# Patient Record
Sex: MALE | URBAN - METROPOLITAN AREA
[De-identification: names, ages, dates, MRNs, and addresses within clinical notes are randomized per-mention and may not be internally consistent; named-entity substitution may affect disease eponyms.]

---

## 2022-04-18 ENCOUNTER — APPOINTMENT (RX ONLY)
Dept: URBAN - METROPOLITAN AREA CLINIC 236 | Facility: CLINIC | Age: 72
Setting detail: DERMATOLOGY
End: 2022-04-18

## 2022-04-18 VITALS — WEIGHT: 160 LBS | HEIGHT: 67 IN

## 2022-04-18 DIAGNOSIS — C86.6 PRIMARY CUTANEOUS CD30-POSITIVE T-CELL PROLIFERATIONS: ICD-10-CM | Status: WELL CONTROLLED

## 2022-04-18 DIAGNOSIS — L57.0 ACTINIC KERATOSIS: ICD-10-CM | Status: STABLE

## 2022-04-18 PROCEDURE — 17003 DESTRUCT PREMALG LES 2-14: CPT

## 2022-04-18 PROCEDURE — ? ADDITIONAL NOTES

## 2022-04-18 PROCEDURE — ? LIQUID NITROGEN

## 2022-04-18 PROCEDURE — 99214 OFFICE O/P EST MOD 30 MIN: CPT | Mod: 25

## 2022-04-18 PROCEDURE — ? PRESCRIPTION MEDICATION MANAGEMENT

## 2022-04-18 PROCEDURE — ? PRESCRIPTION

## 2022-04-18 PROCEDURE — ? LAB REPORTS REVIEWED

## 2022-04-18 PROCEDURE — 17000 DESTRUCT PREMALG LESION: CPT

## 2022-04-18 PROCEDURE — ? COUNSELING

## 2022-04-18 RX ORDER — HYDROCORTISONE 25 MG/G
1 CREAM TOPICAL BID
Qty: 30 | Refills: 2 | Status: ERX | COMMUNITY
Start: 2022-04-18

## 2022-04-18 RX ADMIN — HYDROCORTISONE 1: 25 CREAM TOPICAL at 00:00

## 2022-04-18 ASSESSMENT — LOCATION DETAILED DESCRIPTION DERM
LOCATION DETAILED: LEFT SUPERIOR OCCIPITAL SCALP
LOCATION DETAILED: LEFT INFERIOR CENTRAL MALAR CHEEK
LOCATION DETAILED: MID-OCCIPITAL SCALP
LOCATION DETAILED: RIGHT SUPERIOR FOREHEAD
LOCATION DETAILED: RIGHT ANTERIOR DISTAL THIGH
LOCATION DETAILED: RIGHT INFERIOR LATERAL NECK
LOCATION DETAILED: RIGHT SUPERIOR CRUS OF ANTIHELIX
LOCATION DETAILED: RIGHT FOREHEAD
LOCATION DETAILED: RIGHT SUPERIOR OCCIPITAL SCALP
LOCATION DETAILED: LEFT ANTERIOR DISTAL THIGH

## 2022-04-18 ASSESSMENT — LOCATION ZONE DERM
LOCATION ZONE: LEG
LOCATION ZONE: SCALP
LOCATION ZONE: NECK
LOCATION ZONE: EAR
LOCATION ZONE: FACE

## 2022-04-18 ASSESSMENT — BSA RASH: BSA RASH: 8

## 2022-04-18 ASSESSMENT — LOCATION SIMPLE DESCRIPTION DERM
LOCATION SIMPLE: RIGHT EAR
LOCATION SIMPLE: POSTERIOR SCALP
LOCATION SIMPLE: RIGHT THIGH
LOCATION SIMPLE: LEFT CHEEK
LOCATION SIMPLE: RIGHT ANTERIOR NECK
LOCATION SIMPLE: LEFT THIGH
LOCATION SIMPLE: RIGHT FOREHEAD

## 2022-04-18 ASSESSMENT — SEVERITY ASSESSMENT: SEVERITY: ALMOST CLEAR

## 2022-04-18 NOTE — PROCEDURE: PRESCRIPTION MEDICATION MANAGEMENT
Render In Strict Bullet Format?: No
Detail Level: Zone
Continue Regimen: Cont methotrexate 2.5 6 po q week
Continue Regimen: Methotrexate 2.5 mg p take 6 pills on Thursday and TCS as needed for flare
Plan: Follow up with Dr. Peralta for further treatment per patient sons request

## 2022-06-29 ENCOUNTER — RX ONLY (OUTPATIENT)
Age: 72
Setting detail: RX ONLY
End: 2022-06-29

## 2022-06-29 RX ORDER — METHOTREXATE SODIUM 2.5 MG/1
TABLET ORAL
Qty: 72 | Refills: 1 | Status: ERX

## 2022-06-29 RX ORDER — FOLIC ACID 1 MG/1
TABLET ORAL
Qty: 90 | Refills: 1 | Status: ERX

## 2022-07-25 ENCOUNTER — APPOINTMENT (RX ONLY)
Dept: URBAN - METROPOLITAN AREA CLINIC 236 | Facility: CLINIC | Age: 72
Setting detail: DERMATOLOGY
End: 2022-07-25

## 2022-07-25 DIAGNOSIS — C86.6 PRIMARY CUTANEOUS CD30-POSITIVE T-CELL PROLIFERATIONS: ICD-10-CM | Status: IMPROVED

## 2022-07-25 PROCEDURE — 99213 OFFICE O/P EST LOW 20 MIN: CPT

## 2022-07-25 PROCEDURE — ? COUNSELING

## 2022-07-25 PROCEDURE — ? ADDITIONAL NOTES

## 2022-07-25 PROCEDURE — ? PRESCRIPTION MEDICATION MANAGEMENT

## 2022-07-25 PROCEDURE — ? ORDER TESTS

## 2022-07-25 ASSESSMENT — LOCATION SIMPLE DESCRIPTION DERM
LOCATION SIMPLE: LEFT THIGH
LOCATION SIMPLE: RIGHT THIGH

## 2022-07-25 ASSESSMENT — LOCATION DETAILED DESCRIPTION DERM
LOCATION DETAILED: RIGHT ANTERIOR PROXIMAL THIGH
LOCATION DETAILED: LEFT ANTERIOR PROXIMAL THIGH

## 2022-07-25 ASSESSMENT — BSA RASH: BSA RASH: 2

## 2022-07-25 ASSESSMENT — SEVERITY ASSESSMENT: SEVERITY: ALMOST CLEAR

## 2022-07-25 ASSESSMENT — LOCATION ZONE DERM: LOCATION ZONE: LEG

## 2022-07-25 NOTE — PROCEDURE: PRESCRIPTION MEDICATION MANAGEMENT
Detail Level: Zone
Continue Regimen: Methotrexate 2.5 mg 6 PO Qweek, hydrocortisone cream PRN with flare
Render In Strict Bullet Format?: No

## 2022-07-25 NOTE — PROCEDURE: ORDER TESTS
Bill For Surgical Tray: no
Billing Type: Third-Party Bill
Expected Date Of Service: 07/25/2022
Performing Laboratory: 0

## 2022-07-25 NOTE — PROCEDURE: ADDITIONAL NOTES
Render Risk Assessment In Note?: no
Detail Level: Simple
Additional Notes: Discussed visit with Dr. Peralta agrees with tx plan
Additional Notes: Pt to do bloodwork every 3 months

## 2022-11-28 ENCOUNTER — RX ONLY (OUTPATIENT)
Age: 72
Setting detail: RX ONLY
End: 2022-11-28

## 2022-11-28 RX ORDER — METHOTREXATE SODIUM 2.5 MG/1
TABLET ORAL
Qty: 72 | Refills: 1 | Status: ERX

## 2022-11-28 RX ORDER — HYDROCORTISONE 25 MG/G
1 CREAM TOPICAL BID
Qty: 30 | Refills: 2 | Status: ERX

## 2022-11-28 RX ORDER — KETOCONAZOLE 20 MG/ML
1 SHAMPOO, SUSPENSION TOPICAL
Qty: 120 | Refills: 3 | Status: ERX | COMMUNITY
Start: 2022-11-28

## 2023-01-19 ENCOUNTER — APPOINTMENT (RX ONLY)
Dept: URBAN - METROPOLITAN AREA CLINIC 203 | Facility: CLINIC | Age: 73
Setting detail: DERMATOLOGY
End: 2023-01-19

## 2023-01-19 DIAGNOSIS — Z02.9 ENCOUNTER FOR ADMINISTRATIVE EXAMINATIONS, UNSPECIFIED: ICD-10-CM

## 2023-01-19 PROBLEM — C86.6 PRIMARY CUTANEOUS CD30-POSITIVE T-CELL PROLIFERATIONS: Status: ACTIVE | Noted: 2023-01-19

## 2023-01-19 PROCEDURE — ? ORDER TESTS

## 2023-01-26 ENCOUNTER — APPOINTMENT (RX ONLY)
Dept: URBAN - METROPOLITAN AREA CLINIC 236 | Facility: CLINIC | Age: 73
Setting detail: DERMATOLOGY
End: 2023-01-26

## 2023-01-26 DIAGNOSIS — C86.6 PRIMARY CUTANEOUS CD30-POSITIVE T-CELL PROLIFERATIONS: ICD-10-CM | Status: INADEQUATELY CONTROLLED

## 2023-01-26 DIAGNOSIS — L57.0 ACTINIC KERATOSIS: ICD-10-CM

## 2023-01-26 PROCEDURE — ? PRESCRIPTION MEDICATION MANAGEMENT

## 2023-01-26 PROCEDURE — ? PRESCRIPTION

## 2023-01-26 PROCEDURE — ? COUNSELING

## 2023-01-26 PROCEDURE — 99214 OFFICE O/P EST MOD 30 MIN: CPT | Mod: 25

## 2023-01-26 PROCEDURE — ? LIQUID NITROGEN

## 2023-01-26 PROCEDURE — 17003 DESTRUCT PREMALG LES 2-14: CPT

## 2023-01-26 PROCEDURE — 17000 DESTRUCT PREMALG LESION: CPT

## 2023-01-26 PROCEDURE — ? ORDER TESTS

## 2023-01-26 RX ORDER — METHOTREXATE SODIUM 2.5 MG/1
TABLET ORAL QWEEK
Qty: 72 | Refills: 1 | Status: ERX

## 2023-01-26 RX ORDER — TRIAMCINOLONE ACETONIDE 1 MG/G
OINTMENT TOPICAL
Qty: 453.6 | Refills: 0 | Status: ERX | COMMUNITY
Start: 2023-01-26

## 2023-01-26 RX ADMIN — TRIAMCINOLONE ACETONIDE: 1 OINTMENT TOPICAL at 00:00

## 2023-01-26 ASSESSMENT — LOCATION SIMPLE DESCRIPTION DERM
LOCATION SIMPLE: LEFT LOWER BACK
LOCATION SIMPLE: ABDOMEN
LOCATION SIMPLE: LEFT EYEBROW
LOCATION SIMPLE: SCALP
LOCATION SIMPLE: RIGHT THIGH
LOCATION SIMPLE: LEFT TEMPLE
LOCATION SIMPLE: LEFT THIGH

## 2023-01-26 ASSESSMENT — LOCATION DETAILED DESCRIPTION DERM
LOCATION DETAILED: RIGHT ANTERIOR PROXIMAL THIGH
LOCATION DETAILED: LEFT LATERAL ABDOMEN
LOCATION DETAILED: LEFT LATERAL EYEBROW
LOCATION DETAILED: LEFT ANTERIOR PROXIMAL THIGH
LOCATION DETAILED: LEFT INFERIOR TEMPLE
LOCATION DETAILED: LEFT SUPERIOR PARIETAL SCALP
LOCATION DETAILED: LEFT SUPERIOR LATERAL LOWER BACK

## 2023-01-26 ASSESSMENT — LOCATION ZONE DERM
LOCATION ZONE: FACE
LOCATION ZONE: TRUNK
LOCATION ZONE: LEG
LOCATION ZONE: SCALP

## 2023-01-26 ASSESSMENT — BSA RASH: BSA RASH: 10

## 2023-01-26 ASSESSMENT — SEVERITY ASSESSMENT: SEVERITY: MODERATE

## 2023-01-26 NOTE — PROCEDURE: PRESCRIPTION MEDICATION MANAGEMENT
Render In Strict Bullet Format?: No
Plan: Continue folic acid everyday except the day of methotrexate\\n\\nAdvised to avoid NSAIDs while on MTX
Detail Level: Zone
Discontinue Regimen: Hydrocortisone 2.5%
Initiate Treatment: Triamcinolone oint to active rash bid x 2 weeks

## 2023-01-26 NOTE — PROCEDURE: LIQUID NITROGEN
Show Applicator Variable?: Yes
Detail Level: Detailed
Duration Of Freeze Thaw-Cycle (Seconds): 5
Post-Care Instructions: I reviewed with the patient in detail post-care instructions. Patient is to wear sunprotection, and avoid picking at any of the treated lesions. Pt may apply Vaseline to crusted or scabbing areas.
Render Post-Care Instructions In Note?: no
Number Of Freeze-Thaw Cycles: 1 freeze-thaw cycle
Consent: The patient's consent was obtained including but not limited to risks of crusting, scabbing, blistering, scarring, darker or lighter pigmentary change, recurrence, incomplete removal and infection.
Application Tool (Optional): Liquid Nitrogen Sprayer

## 2023-01-26 NOTE — PROCEDURE: ORDER TESTS
Expected Date Of Service: 01/26/2023
Performing Laboratory: 0
Clinical Notes (To The Lab): Standing order q 3 months
Billing Type: Third-Party Bill
Bill For Surgical Tray: no

## 2023-02-16 ENCOUNTER — RX ONLY (OUTPATIENT)
Age: 73
Setting detail: RX ONLY
End: 2023-02-16

## 2023-02-16 RX ORDER — FOLIC ACID 1 MG/1
TABLET ORAL
Qty: 90 | Refills: 1 | Status: ERX

## 2023-02-16 RX ORDER — METHOTREXATE SODIUM 2.5 MG/1
TABLET ORAL QWEEK
Qty: 72 | Refills: 1 | Status: CANCELLED
Stop reason: CLARIF

## 2023-04-27 ENCOUNTER — APPOINTMENT (RX ONLY)
Dept: URBAN - METROPOLITAN AREA CLINIC 236 | Facility: CLINIC | Age: 73
Setting detail: DERMATOLOGY
End: 2023-04-27

## 2023-04-27 DIAGNOSIS — C86.6 PRIMARY CUTANEOUS CD30-POSITIVE T-CELL PROLIFERATIONS: ICD-10-CM | Status: WELL CONTROLLED

## 2023-04-27 PROCEDURE — ? COUNSELING

## 2023-04-27 PROCEDURE — 99213 OFFICE O/P EST LOW 20 MIN: CPT

## 2023-04-27 PROCEDURE — ? PRESCRIPTION

## 2023-04-27 PROCEDURE — ? PRESCRIPTION MEDICATION MANAGEMENT

## 2023-04-27 RX ORDER — TRIAMCINOLONE ACETONIDE 1 MG/G
CREAM TOPICAL
Qty: 453.6 | Refills: 5 | Status: ERX | COMMUNITY
Start: 2023-04-27

## 2023-04-27 RX ADMIN — TRIAMCINOLONE ACETONIDE: 1 CREAM TOPICAL at 00:00

## 2023-04-27 ASSESSMENT — LOCATION DETAILED DESCRIPTION DERM
LOCATION DETAILED: LEFT SUPERIOR LATERAL LOWER BACK
LOCATION DETAILED: LEFT ANTERIOR PROXIMAL THIGH
LOCATION DETAILED: RIGHT ANTERIOR PROXIMAL THIGH
LOCATION DETAILED: LEFT LATERAL ABDOMEN

## 2023-04-27 ASSESSMENT — LOCATION ZONE DERM
LOCATION ZONE: TRUNK
LOCATION ZONE: LEG

## 2023-04-27 ASSESSMENT — LOCATION SIMPLE DESCRIPTION DERM
LOCATION SIMPLE: ABDOMEN
LOCATION SIMPLE: RIGHT THIGH
LOCATION SIMPLE: LEFT THIGH
LOCATION SIMPLE: LEFT LOWER BACK

## 2023-04-27 ASSESSMENT — SEVERITY ASSESSMENT: SEVERITY: ALMOST CLEAR

## 2023-08-24 ENCOUNTER — RX ONLY (OUTPATIENT)
Age: 73
Setting detail: RX ONLY
End: 2023-08-24

## 2023-08-24 RX ORDER — METHOTREXATE SODIUM 2.5 MG/1
TABLET ORAL QWEEK
Qty: 72 | Refills: 1 | Status: ERX

## 2023-08-24 RX ORDER — FOLIC ACID 1 MG/1
TABLET ORAL
Qty: 90 | Refills: 1 | Status: ERX

## 2023-10-26 ENCOUNTER — APPOINTMENT (RX ONLY)
Dept: URBAN - METROPOLITAN AREA CLINIC 236 | Facility: CLINIC | Age: 73
Setting detail: DERMATOLOGY
End: 2023-10-26

## 2023-10-26 DIAGNOSIS — C86.6 PRIMARY CUTANEOUS CD30-POSITIVE T-CELL PROLIFERATIONS: ICD-10-CM | Status: WELL CONTROLLED

## 2023-10-26 PROCEDURE — 99213 OFFICE O/P EST LOW 20 MIN: CPT

## 2023-10-26 PROCEDURE — ? PRESCRIPTION MEDICATION MANAGEMENT

## 2023-10-26 PROCEDURE — ? LAB REPORTS REVIEWED

## 2023-10-26 PROCEDURE — ? COUNSELING

## 2023-10-26 ASSESSMENT — LOCATION SIMPLE DESCRIPTION DERM
LOCATION SIMPLE: RIGHT THIGH
LOCATION SIMPLE: LEFT THIGH
LOCATION SIMPLE: ABDOMEN
LOCATION SIMPLE: LEFT LOWER BACK

## 2023-10-26 ASSESSMENT — LOCATION DETAILED DESCRIPTION DERM
LOCATION DETAILED: LEFT LATERAL ABDOMEN
LOCATION DETAILED: LEFT ANTERIOR PROXIMAL THIGH
LOCATION DETAILED: LEFT SUPERIOR LATERAL LOWER BACK
LOCATION DETAILED: RIGHT ANTERIOR PROXIMAL THIGH

## 2023-10-26 ASSESSMENT — LOCATION ZONE DERM
LOCATION ZONE: TRUNK
LOCATION ZONE: LEG

## 2023-10-26 ASSESSMENT — BSA RASH: BSA RASH: 5

## 2023-10-26 ASSESSMENT — SEVERITY ASSESSMENT: SEVERITY: MODERATE

## 2023-10-26 NOTE — PROCEDURE: PRESCRIPTION MEDICATION MANAGEMENT
Render In Strict Bullet Format?: No
Continue Regimen: Triamcinolone oint to active three x a week \\n\\nAdvised to avoid NSAIDs while on MTX.
Detail Level: Zone
Initiate Treatment: Triamcinolone oint to active rash bid x 2 weeks\\n\\nMethotrexate take 6 tabs q week\\n\\nTake folic acid \\n\\nFollow up with oncology yearly

## 2024-01-25 ENCOUNTER — APPOINTMENT (RX ONLY)
Dept: URBAN - METROPOLITAN AREA CLINIC 236 | Facility: CLINIC | Age: 74
Setting detail: DERMATOLOGY
End: 2024-01-25

## 2024-01-25 DIAGNOSIS — C86.6 PRIMARY CUTANEOUS CD30-POSITIVE T-CELL PROLIFERATIONS: ICD-10-CM | Status: WELL CONTROLLED

## 2024-01-25 DIAGNOSIS — L57.0 ACTINIC KERATOSIS: ICD-10-CM | Status: INADEQUATELY CONTROLLED

## 2024-01-25 DIAGNOSIS — L82.1 OTHER SEBORRHEIC KERATOSIS: ICD-10-CM | Status: STABLE

## 2024-01-25 PROCEDURE — 17000 DESTRUCT PREMALG LESION: CPT

## 2024-01-25 PROCEDURE — 99213 OFFICE O/P EST LOW 20 MIN: CPT | Mod: 25

## 2024-01-25 PROCEDURE — ? COUNSELING

## 2024-01-25 PROCEDURE — 17003 DESTRUCT PREMALG LES 2-14: CPT

## 2024-01-25 PROCEDURE — ? LIQUID NITROGEN

## 2024-01-25 PROCEDURE — ? PRESCRIPTION MEDICATION MANAGEMENT

## 2024-01-25 ASSESSMENT — LOCATION DETAILED DESCRIPTION DERM
LOCATION DETAILED: RIGHT ANTERIOR PROXIMAL THIGH
LOCATION DETAILED: LEFT SUPERIOR PARIETAL SCALP
LOCATION DETAILED: LEFT SUPERIOR FOREHEAD
LOCATION DETAILED: LEFT ANTERIOR PROXIMAL THIGH
LOCATION DETAILED: RIGHT SUPERIOR CENTRAL MALAR CHEEK
LOCATION DETAILED: RIGHT SUPERIOR PARIETAL SCALP
LOCATION DETAILED: RIGHT LATERAL FOREHEAD
LOCATION DETAILED: LEFT SUPERIOR OCCIPITAL SCALP
LOCATION DETAILED: LEFT CENTRAL MALAR CHEEK
LOCATION DETAILED: LEFT SUPERIOR LATERAL LOWER BACK
LOCATION DETAILED: RIGHT CENTRAL EYEBROW
LOCATION DETAILED: LEFT LATERAL ABDOMEN
LOCATION DETAILED: RIGHT SUPERIOR FOREHEAD

## 2024-01-25 ASSESSMENT — LOCATION SIMPLE DESCRIPTION DERM
LOCATION SIMPLE: LEFT THIGH
LOCATION SIMPLE: LEFT LOWER BACK
LOCATION SIMPLE: RIGHT FOREHEAD
LOCATION SIMPLE: RIGHT THIGH
LOCATION SIMPLE: RIGHT CHEEK
LOCATION SIMPLE: SCALP
LOCATION SIMPLE: LEFT OCCIPITAL SCALP
LOCATION SIMPLE: RIGHT EYEBROW
LOCATION SIMPLE: LEFT FOREHEAD
LOCATION SIMPLE: ABDOMEN
LOCATION SIMPLE: LEFT CHEEK

## 2024-01-25 ASSESSMENT — LOCATION ZONE DERM
LOCATION ZONE: FACE
LOCATION ZONE: LEG
LOCATION ZONE: TRUNK
LOCATION ZONE: SCALP

## 2024-01-25 ASSESSMENT — SEVERITY ASSESSMENT: SEVERITY: MODERATE

## 2024-01-25 ASSESSMENT — BSA RASH: BSA RASH: 5

## 2024-01-25 NOTE — PROCEDURE: PRESCRIPTION MEDICATION MANAGEMENT
Render In Strict Bullet Format?: No
Plan: Advised to avoid NSAIDs while on Methotrexate \\n\\nTake folic acid \\n\\nFollow up with oncology yearly
Continue Regimen: Triamcinolone to affected areas 3x/week
Detail Level: Zone
Modify Regimen: Methotrexate take 4 tabs q week

## 2024-01-25 NOTE — PROCEDURE: LIQUID NITROGEN
Number Of Freeze-Thaw Cycles: 1 freeze-thaw cycle
Detail Level: Detailed
Consent: The patient's consent was obtained including but not limited to risks of crusting, scabbing, blistering, scarring, darker or lighter pigmentary change, recurrence, incomplete removal and infection.
Render Post-Care Instructions In Note?: no
Post-Care Instructions: I reviewed with the patient in detail post-care instructions. Patient is to wear sunprotection, and avoid picking at any of the treated lesions. Pt may apply Vaseline to crusted or scabbing areas.
Show Aperture Variable?: Yes
Application Tool (Optional): Liquid Nitrogen Sprayer
Duration Of Freeze Thaw-Cycle (Seconds): 5

## 2024-04-25 ENCOUNTER — APPOINTMENT (RX ONLY)
Dept: URBAN - METROPOLITAN AREA CLINIC 236 | Facility: CLINIC | Age: 74
Setting detail: DERMATOLOGY
End: 2024-04-25

## 2024-04-25 DIAGNOSIS — L21.8 OTHER SEBORRHEIC DERMATITIS: ICD-10-CM

## 2024-04-25 DIAGNOSIS — C86.6 PRIMARY CUTANEOUS CD30-POSITIVE T-CELL PROLIFERATIONS: ICD-10-CM | Status: WELL CONTROLLED

## 2024-04-25 PROCEDURE — 99214 OFFICE O/P EST MOD 30 MIN: CPT

## 2024-04-25 PROCEDURE — ? MEDICATION COUNSELING

## 2024-04-25 PROCEDURE — ? MDM - TREATMENT GOALS

## 2024-04-25 PROCEDURE — ? PRESCRIPTION

## 2024-04-25 PROCEDURE — ? PRESCRIPTION MEDICATION MANAGEMENT

## 2024-04-25 PROCEDURE — ? COUNSELING

## 2024-04-25 RX ORDER — TRIAMCINOLONE ACETONIDE 1 MG/G
CREAM TOPICAL
Qty: 453.6 | Refills: 5 | Status: ERX

## 2024-04-25 RX ORDER — KETOCONAZOLE 20 MG/ML
SHAMPOO, SUSPENSION TOPICAL TIW
Qty: 120 | Refills: 5 | Status: ERX

## 2024-04-25 ASSESSMENT — SEVERITY ASSESSMENT: SEVERITY: ALMOST CLEAR

## 2024-04-25 ASSESSMENT — LOCATION DETAILED DESCRIPTION DERM
LOCATION DETAILED: SUPERIOR MID FOREHEAD
LOCATION DETAILED: RIGHT ANTERIOR PROXIMAL THIGH
LOCATION DETAILED: LEFT LATERAL ABDOMEN
LOCATION DETAILED: LEFT ANTERIOR PROXIMAL THIGH
LOCATION DETAILED: LEFT SUPERIOR LATERAL LOWER BACK

## 2024-04-25 ASSESSMENT — LOCATION ZONE DERM
LOCATION ZONE: TRUNK
LOCATION ZONE: FACE
LOCATION ZONE: LEG

## 2024-04-25 ASSESSMENT — LOCATION SIMPLE DESCRIPTION DERM
LOCATION SIMPLE: LEFT THIGH
LOCATION SIMPLE: LEFT LOWER BACK
LOCATION SIMPLE: SUPERIOR FOREHEAD
LOCATION SIMPLE: ABDOMEN
LOCATION SIMPLE: RIGHT THIGH

## 2024-04-25 NOTE — PROCEDURE: PRESCRIPTION MEDICATION MANAGEMENT
Render In Strict Bullet Format?: No
Plan: Advised to avoid NSAIDs while on Methotrexate \\n\\nTake folic acid \\n\\nFollow up with oncology yearly\\n\\nPatient to hold methotrexate due to having rotator cuff surgery will continue after cleared by surgeon
Continue Regimen: Triamcinolone to affected areas 3x/week
Detail Level: Zone
Modify Regimen: Methotrexate take 4 tabs q week
Continue Regimen: ketoconazole 2 % shampoo TIW\\nQuantity: 120.0 ml  Days Supply: 30\\nSig: apply to  the scalp 2-3x a week. let it sit for 5 minutes before you rinse .

## 2024-04-25 NOTE — PROCEDURE: MEDICATION COUNSELING
Carac Counseling:  I discussed with the patient the risks of Carac including but not limited to erythema, scaling, itching, weeping, crusting, and pain.
Siliq Counseling:  I discussed with the patient the risks of Siliq including but not limited to new or worsening depression, suicidal thoughts and behavior, immunosuppression, malignancy, posterior leukoencephalopathy syndrome, and serious infections.  The patient understands that monitoring is required including a PPD at baseline and must alert us or the primary physician if symptoms of infection or other concerning signs are noted. There is also a special program designed to monitor depression which is required with Siliq.
Elidel Pregnancy And Lactation Text: This medication is Pregnancy Category C. It is unknown if this medication is excreted in breast milk.
Cimetidine Counseling:  I discussed with the patient the risks of Cimetidine including but not limited to gynecomastia, headache, diarrhea, nausea, drowsiness, arrhythmias, pancreatitis, skin rashes, psychosis, bone marrow suppression and kidney toxicity.
SSKI Counseling:  I discussed with the patient the risks of SSKI including but not limited to thyroid abnormalities, metallic taste, GI upset, fever, headache, acne, arthralgias, paraesthesias, lymphadenopathy, easy bleeding, arrhythmias, and allergic reaction.
Use Enhanced Medication Counseling?: No
Griseofulvin Counseling:  I discussed with the patient the risks of griseofulvin including but not limited to photosensitivity, cytopenia, liver damage, nausea/vomiting and severe allergy.  The patient understands that this medication is best absorbed when taken with a fatty meal (e.g., ice cream or french fries).
Klisyri Pregnancy And Lactation Text: It is unknown if this medication can harm a developing fetus or if it is excreted in breast milk.
Finasteride Pregnancy And Lactation Text: This medication is absolutely contraindicated during pregnancy. It is unknown if it is excreted in breast milk.
Protopic Counseling: Patient may experience a mild burning sensation during topical application. Protopic is not approved in children less than 2 years of age. There have been case reports of hematologic and skin malignancies in patients using topical calcineurin inhibitors although causality is questionable.
Taltz Counseling: I discussed with the patient the risks of ixekizumab including but not limited to immunosuppression, serious infections, worsening of inflammatory bowel disease and drug reactions.  The patient understands that monitoring is required including a PPD at baseline and must alert us or the primary physician if symptoms of infection or other concerning signs are noted.
Soolantra Pregnancy And Lactation Text: This medication is Pregnancy Category C. This medication is considered safe during breast feeding.
Sotyktu Counseling:  I discussed the most common side effects of Sotyktu including: common cold, sore throat, sinus infections, cold sores, canker sores, folliculitis, and acne.? I also discussed more serious side effects of Sotyktu including but not limited to: serious allergic reactions; increased risk for infections such as TB; cancers such as lymphomas; rhabdomyolysis and elevated CPK; and elevated triglycerides and liver enzymes.?
Wartpeel Counseling:  I discussed with the patient the risks of Wartpeel including but not limited to erythema, scaling, itching, weeping, crusting, and pain.
Ivermectin Pregnancy And Lactation Text: This medication is Pregnancy Category C and it isn't known if it is safe during pregnancy. It is also excreted in breast milk.
Libtayo Pregnancy And Lactation Text: This medication is contraindicated in pregnancy and when breast feeding.
Azithromycin Pregnancy And Lactation Text: This medication is considered safe during pregnancy and is also secreted in breast milk.
Erythromycin Counseling:  I discussed with the patient the risks of erythromycin including but not limited to GI upset, allergic reaction, drug rash, diarrhea, increase in liver enzymes, and yeast infections.
High Dose Vitamin A Counseling: Side effects reviewed, pt to contact office should one occur.
Rifampin Pregnancy And Lactation Text: This medication is Pregnancy Category C and it isn't know if it is safe during pregnancy. It is also excreted in breast milk and should not be used if you are breast feeding.
Cimetidine Pregnancy And Lactation Text: This medication is Pregnancy Category B and is considered safe during pregnancy. It is also excreted in breast milk and breast feeding isn't recommended.
Colchicine Pregnancy And Lactation Text: This medication is Pregnancy Category C and isn't considered safe during pregnancy. It is excreted in breast milk.
Hydroxychloroquine Pregnancy And Lactation Text: This medication has been shown to cause fetal harm but it isn't assigned a Pregnancy Risk Category. There are small amounts excreted in breast milk.
Oral Minoxidil Counseling- I discussed with the patient the risks of oral minoxidil including but not limited to shortness of breath, swelling of the feet or ankles, dizziness, lightheadedness, unwanted hair growth and allergic reaction.  The patient verbalized understanding of the proper use and possible adverse effects of oral minoxidil.  All of the patient's questions and concerns were addressed.
Topical Metronidazole Counseling: Metronidazole is a topical antibiotic medication. You may experience burning, stinging, redness, or allergic reactions.  Please call our office if you develop any problems from using this medication.
Cosentyx Pregnancy And Lactation Text: This medication is Pregnancy Category B and is considered safe during pregnancy. It is unknown if this medication is excreted in breast milk.
Cellcept Pregnancy And Lactation Text: This medication is Pregnancy Category D and isn't considered safe during pregnancy. It is unknown if this medication is excreted in breast milk.
Protopic Pregnancy And Lactation Text: This medication is Pregnancy Category C. It is unknown if this medication is excreted in breast milk when applied topically.
Topical Retinoid counseling:  Patient advised to apply a pea-sized amount only at bedtime and wait 30 minutes after washing their face before applying.  If too drying, patient may add a non-comedogenic moisturizer. The patient verbalized understanding of the proper use and possible adverse effects of retinoids.  All of the patient's questions and concerns were addressed.
Cibinqo Counseling: I discussed with the patient the risks of Cibinqo therapy including but not limited to common cold, nausea, headache, cold sores, increased blood CPK levels, dizziness, UTIs, fatigue, acne, and vomitting. Live vaccines should be avoided.  This medication has been linked to serious infections; higher rate of mortality; malignancy and lymphoproliferative disorders; major adverse cardiovascular events; thrombosis; thrombocytopenia and lymphopenia; lipid elevations; and retinal detachment.
Birth Control Pills Counseling: Birth Control Pill Counseling: I discussed with the patient the potential side effects of OCPs including but not limited to increased risk of stroke, heart attack, thrombophlebitis, deep venous thrombosis, hepatic adenomas, breast changes, GI upset, headaches, and depression.  The patient verbalized understanding of the proper use and possible adverse effects of OCPs. All of the patient's questions and concerns were addressed.
Carac Pregnancy And Lactation Text: This medication is Pregnancy Category X and contraindicated in pregnancy and in women who may become pregnant. It is unknown if this medication is excreted in breast milk.
Taltz Pregnancy And Lactation Text: The risk during pregnancy and breastfeeding is uncertain with this medication.
Minoxidil Counseling: Minoxidil is a topical medication which can increase blood flow where it is applied. It is uncertain how this medication increases hair growth. Side effects are uncommon and include stinging and allergic reactions.
Sski Pregnancy And Lactation Text: This medication is Pregnancy Category D and isn't considered safe during pregnancy. It is excreted in breast milk.
Erythromycin Pregnancy And Lactation Text: This medication is Pregnancy Category B and is considered safe during pregnancy. It is also excreted in breast milk.
High Dose Vitamin A Pregnancy And Lactation Text: High dose vitamin A therapy is contraindicated during pregnancy and breast feeding.
Bactrim Counseling:  I discussed with the patient the risks of sulfa antibiotics including but not limited to GI upset, allergic reaction, drug rash, diarrhea, dizziness, photosensitivity, and yeast infections.  Rarely, more serious reactions can occur including but not limited to aplastic anemia, agranulocytosis, methemoglobinemia, blood dyscrasias, liver or kidney failure, lung infiltrates or desquamative/blistering drug rashes.
Sarecycline Counseling: Patient advised regarding possible photosensitivity and discoloration of the teeth, skin, lips, tongue and gums.  Patient instructed to avoid sunlight, if possible.  When exposed to sunlight, patients should wear protective clothing, sunglasses, and sunscreen.  The patient was instructed to call the office immediately if the following severe adverse effects occur:  hearing changes, easy bruising/bleeding, severe headache, or vision changes.  The patient verbalized understanding of the proper use and possible adverse effects of sarecycline.  All of the patient's questions and concerns were addressed.
Griseofulvin Pregnancy And Lactation Text: This medication is Pregnancy Category X and is known to cause serious birth defects. It is unknown if this medication is excreted in breast milk but breast feeding should be avoided.
Eucrisa Counseling: Patient may experience a mild burning sensation during topical application. Eucrisa is not approved in children less than 2 years of age.
Sotyktu Pregnancy And Lactation Text: There is insufficient data to evaluate whether or not Sotyktu is safe to use during pregnancy.? ?It is not known if Sotyktu passes into breast milk and whether or not it is safe to use when breastfeeding.??
Topical Metronidazole Pregnancy And Lactation Text: This medication is Pregnancy Category B and considered safe during pregnancy.  It is also considered safe to use while breastfeeding.
Dapsone Counseling: I discussed with the patient the risks of dapsone including but not limited to hemolytic anemia, agranulocytosis, rashes, methemoglobinemia, kidney failure, peripheral neuropathy, headaches, GI upset, and liver toxicity.  Patients who start dapsone require monitoring including baseline LFTs and weekly CBCs for the first month, then every month thereafter.  The patient verbalized understanding of the proper use and possible adverse effects of dapsone.  All of the patient's questions and concerns were addressed.
Odomzo Counseling- I discussed with the patient the risks of Odomzo including but not limited to nausea, vomiting, diarrhea, constipation, weight loss, changes in the sense of taste, decreased appetite, muscle spasms, and hair loss.  The patient verbalized understanding of the proper use and possible adverse effects of Odomzo.  All of the patient's questions and concerns were addressed.
Cyclophosphamide Counseling:  I discussed with the patient the risks of cyclophosphamide including but not limited to hair loss, hormonal abnormalities, decreased fertility, abdominal pain, diarrhea, nausea and vomiting, bone marrow suppression and infection. The patient understands that monitoring is required while taking this medication.
Dupixent Counseling: I discussed with the patient the risks of dupilumab including but not limited to eye infection and irritation, cold sores, injection site reactions, worsening of asthma, allergic reactions and increased risk of parasitic infection.  Live vaccines should be avoided while taking dupilumab. Dupilumab will also interact with certain medications such as warfarin and cyclosporine. The patient understands that monitoring is required and they must alert us or the primary physician if symptoms of infection or other concerning signs are noted.
Tremfya Counseling: I discussed with the patient the risks of guselkumab including but not limited to immunosuppression, serious infections, and drug reactions.  The patient understands that monitoring is required including a PPD at baseline and must alert us or the primary physician if symptoms of infection or other concerning signs are noted.
Doxepin Counseling:  Patient advised that the medication is sedating and not to drive a car after taking this medication. Patient informed of potential adverse effects including but not limited to dry mouth, urinary retention, and blurry vision.  The patient verbalized understanding of the proper use and possible adverse effects of doxepin.  All of the patient's questions and concerns were addressed.
Birth Control Pills Pregnancy And Lactation Text: This medication should be avoided if pregnant and for the first 30 days post-partum.
Adbry Counseling: I discussed with the patient the risks of tralokinumab including but not limited to eye infection and irritation, cold sores, injection site reactions, worsening of asthma, allergic reactions and increased risk of parasitic infection.  Live vaccines should be avoided while taking tralokinumab. The patient understands that monitoring is required and they must alert us or the primary physician if symptoms of infection or other concerning signs are noted.
Oral Minoxidil Pregnancy And Lactation Text: This medication should only be used when clearly needed if you are pregnant, attempting to become pregnant or breast feeding.
Thalidomide Counseling: I discussed with the patient the risks of thalidomide including but not limited to birth defects, anxiety, weakness, chest pain, dizziness, cough and severe allergy.
Low Dose Naltrexone Counseling- I discussed with the patient the potential risks and side effects of low dose naltrexone including but not limited to: more vivid dreams, headaches, nausea, vomiting, abdominal pain, fatigue, dizziness, and anxiety.
Calcipotriene Counseling:  I discussed with the patient the risks of calcipotriene including but not limited to erythema, scaling, itching, and irritation.
Qbrexza Counseling:  I discussed with the patient the risks of Qbrexza including but not limited to headache, mydriasis, blurred vision, dry eyes, nasal dryness, dry mouth, dry throat, dry skin, urinary hesitation, and constipation.  Local skin reactions including erythema, burning, stinging, and itching can also occur.
Metronidazole Counseling:  I discussed with the patient the risks of metronidazole including but not limited to seizures, nausea/vomiting, a metallic taste in the mouth, nausea/vomiting and severe allergy.
Cibinqo Pregnancy And Lactation Text: It is unknown if this medication will adversely affect pregnancy or breast feeding.  You should not take this medication if you are currently pregnant or planning a pregnancy or while breastfeeding.
Xeljanz Counseling: I discussed with the patient the risks of Xeljanz therapy including increased risk of infection, liver issues, headache, diarrhea, or cold symptoms. Live vaccines should be avoided. They were instructed to call if they have any problems.
Eucrisa Pregnancy And Lactation Text: This medication has not been assigned a Pregnancy Risk Category but animal studies failed to show danger with the topical medication. It is unknown if the medication is excreted in breast milk.
Opioid Counseling: I discussed with the patient the potential side effects of opioids including but not limited to addiction, altered mental status, and depression. I stressed avoiding alcohol, benzodiazepines, muscle relaxants and sleep aids unless specifically okayed by a physician. The patient verbalized understanding of the proper use and possible adverse effects of opioids. All of the patient's questions and concerns were addressed. They were instructed to flush the remaining pills down the toilet if they did not need them for pain.
Bactrim Pregnancy And Lactation Text: This medication is Pregnancy Category D and is known to cause fetal risk.  It is also excreted in breast milk.
Dupixent Pregnancy And Lactation Text: This medication likely crosses the placenta but the risk for the fetus is uncertain. This medication is excreted in breast milk.
Cyclophosphamide Pregnancy And Lactation Text: This medication is Pregnancy Category D and it isn't considered safe during pregnancy. This medication is excreted in breast milk.
Simponi Counseling:  I discussed with the patient the risks of golimumab including but not limited to myelosuppression, immunosuppression, autoimmune hepatitis, demyelinating diseases, lymphoma, and serious infections.  The patient understands that monitoring is required including a PPD at baseline and must alert us or the primary physician if symptoms of infection or other concerning signs are noted.
Winlevi Counseling:  I discussed with the patient the risks of topical clascoterone including but not limited to erythema, scaling, itching, and stinging. Patient voiced their understanding.
Calcipotriene Pregnancy And Lactation Text: The use of this medication during pregnancy or lactation is not recommended as there is insufficient data.
Sarecycline Pregnancy And Lactation Text: This medication is Pregnancy Category D and not consider safe during pregnancy. It is also excreted in breast milk.
Itraconazole Counseling:  I discussed with the patient the risks of itraconazole including but not limited to liver damage, nausea/vomiting, neuropathy, and severe allergy.  The patient understands that this medication is best absorbed when taken with acidic beverages such as non-diet cola or ginger ale.  The patient understands that monitoring is required including baseline LFTs and repeat LFTs at intervals.  The patient understands that they are to contact us or the primary physician if concerning signs are noted.
Acitretin Counseling:  I discussed with the patient the risks of acitretin including but not limited to hair loss, dry lips/skin/eyes, liver damage, hyperlipidemia, depression/suicidal ideation, photosensitivity.  Serious rare side effects can include but are not limited to pancreatitis, pseudotumor cerebri, bony changes, clot formation/stroke/heart attack.  Patient understands that alcohol is contraindicated since it can result in liver toxicity and significantly prolong the elimination of the drug by many years.
Thalidomide Pregnancy And Lactation Text: This medication is Pregnancy Category X and is absolutely contraindicated during pregnancy. It is unknown if it is excreted in breast milk.
Otezla Counseling: The side effects of Otezla were discussed with the patient, including but not limited to worsening or new depression, weight loss, diarrhea, nausea, upper respiratory tract infection, and headache. Patient instructed to call the office should any adverse effect occur.  The patient verbalized understanding of the proper use and possible adverse effects of Otezla.  All the patient's questions and concerns were addressed.
Doxepin Pregnancy And Lactation Text: This medication is Pregnancy Category C and it isn't known if it is safe during pregnancy. It is also excreted in breast milk and breast feeding isn't recommended.
Low Dose Naltrexone Pregnancy And Lactation Text: Naltrexone is pregnancy category C.  There have been no adequate and well-controlled studies in pregnant women.  It should be used in pregnancy only if the potential benefit justifies the potential risk to the fetus.   Limited data indicates that naltrexone is minimally excreted into breastmilk.
Xelhernánz Pregnancy And Lactation Text: This medication is Pregnancy Category D and is not considered safe during pregnancy.  The risk during breast feeding is also uncertain.
Hydroquinone Counseling:  Patient advised that medication may result in skin irritation, lightening (hypopigmentation), dryness, and burning.  In the event of skin irritation, the patient was advised to reduce the amount of the drug applied or use it less frequently.  Rarely, spots that are treated with hydroquinone can become darker (pseudoochronosis).  Should this occur, patient instructed to stop medication and call the office. The patient verbalized understanding of the proper use and possible adverse effects of hydroquinone.  All of the patient's questions and concerns were addressed.
Litfulo Counseling: I discussed with the patient the risks of Litfulo therapy including but not limited to upper respiratory tract infections, shingles, cold sores, and nausea. Live vaccines should be avoided.  This medication has been linked to serious infections; higher rate of mortality; malignancy and lymphoproliferative disorders; major adverse cardiovascular events; thrombosis; gastrointestinal perforations; neutropenia; lymphopenia; anemia; liver enzyme elevations; and lipid elevations.
Mirvaso Counseling: Mirvaso is a topical medication which can decrease superficial blood flow where applied. Side effects are uncommon and include stinging, redness and allergic reactions.
Qbrexza Pregnancy And Lactation Text: There is no available data on Qbrexza use in pregnant women.  There is no available data on Qbrexza use in lactation.
Adbry Pregnancy And Lactation Text: It is unknown if this medication will adversely affect pregnancy or breast feeding.
Opioid Pregnancy And Lactation Text: These medications can lead to premature delivery and should be avoided during pregnancy. These medications are also present in breast milk in small amounts.
Aklief counseling:  Patient advised to apply a pea-sized amount only at bedtime and wait 30 minutes after washing their face before applying.  If too drying, patient may add a non-comedogenic moisturizer.  The most commonly reported side effects including irritation, redness, scaling, dryness, stinging, burning, itching, and increased risk of sunburn.  The patient verbalized understanding of the proper use and possible adverse effects of retinoids.  All of the patient's questions and concerns were addressed.
Ilumya Counseling: I discussed with the patient the risks of tildrakizumab including but not limited to immunosuppression, malignancy, posterior leukoencephalopathy syndrome, and serious infections.  The patient understands that monitoring is required including a PPD at baseline and must alert us or the primary physician if symptoms of infection or other concerning signs are noted.
Cantharidin Counseling:  I discussed with the patient the risks of Cantharidin including but not limited to pain, redness, burning, itching, and blistering.
Metronidazole Pregnancy And Lactation Text: This medication is Pregnancy Category B and considered safe during pregnancy.  It is also excreted in breast milk.
Dapsone Pregnancy And Lactation Text: This medication is Pregnancy Category C and is not considered safe during pregnancy or breast feeding.
Acitretin Pregnancy And Lactation Text: This medication is Pregnancy Category X and should not be given to women who are pregnant or may become pregnant in the future. This medication is excreted in breast milk.
Cephalexin Counseling: I counseled the patient regarding use of cephalexin as an antibiotic for prophylactic and/or therapeutic purposes. Cephalexin (commonly prescribed under brand name Keflex) is a cephalosporin antibiotic which is active against numerous classes of bacteria, including most skin bacteria. Side effects may include nausea, diarrhea, gastrointestinal upset, rash, hives, yeast infections, and in rare cases, hepatitis, kidney disease, seizures, fever, confusion, neurologic symptoms, and others. Patients with severe allergies to penicillin medications are cautioned that there is about a 10% incidence of cross-reactivity with cephalosporins. When possible, patients with penicillin allergies should use alternatives to cephalosporins for antibiotic therapy.
Tazorac Counseling:  Patient advised that medication is irritating and drying.  Patient may need to apply sparingly and wash off after an hour before eventually leaving it on overnight.  The patient verbalized understanding of the proper use and possible adverse effects of tazorac.  All of the patient's questions and concerns were addressed.
Spironolactone Counseling: Patient advised regarding risks of diarrhea, abdominal pain, hyperkalemia, birth defects (for female patients), liver toxicity and renal toxicity. The patient may need blood work to monitor liver and kidney function and potassium levels while on therapy. The patient verbalized understanding of the proper use and possible adverse effects of spironolactone.  All of the patient's questions and concerns were addressed.
Itraconazole Pregnancy And Lactation Text: This medication is Pregnancy Category C and it isn't know if it is safe during pregnancy. It is also excreted in breast milk.
Enbrel Counseling:  I discussed with the patient the risks of etanercept including but not limited to myelosuppression, immunosuppression, autoimmune hepatitis, demyelinating diseases, lymphoma, and infections.  The patient understands that monitoring is required including a PPD at baseline and must alert us or the primary physician if symptoms of infection or other concerning signs are noted.
Tetracycline Counseling: Patient counseled regarding possible photosensitivity and increased risk for sunburn.  Patient instructed to avoid sunlight, if possible.  When exposed to sunlight, patients should wear protective clothing, sunglasses, and sunscreen.  The patient was instructed to call the office immediately if the following severe adverse effects occur:  hearing changes, easy bruising/bleeding, severe headache, or vision changes.  The patient verbalized understanding of the proper use and possible adverse effects of tetracycline.  All of the patient's questions and concerns were addressed. Patient understands to avoid pregnancy while on therapy due to potential birth defects.
Cantharidin Pregnancy And Lactation Text: This medication has not been proven safe during pregnancy. It is unknown if this medication is excreted in breast milk.
Cyclosporine Counseling:  I discussed with the patient the risks of cyclosporine including but not limited to hypertension, gingival hyperplasia,myelosuppression, immunosuppression, liver damage, kidney damage, neurotoxicity, lymphoma, and serious infections. The patient understands that monitoring is required including baseline blood pressure, CBC, CMP, lipid panel and uric acid, and then 1-2 times monthly CMP and blood pressure.
Winlevi Pregnancy And Lactation Text: This medication is considered safe during pregnancy and breastfeeding.
Tranexamic Acid Counseling:  Patient advised of the small risk of bleeding problems with tranexamic acid. They were also instructed to call if they developed any nausea, vomiting or diarrhea. All of the patient's questions and concerns were addressed.
Aklief Pregnancy And Lactation Text: It is unknown if this medication is safe to use during pregnancy.  It is unknown if this medication is excreted in breast milk.  Breastfeeding women should use the topical cream on the smallest area of the skin for the shortest time needed while breastfeeding.  Do not apply to nipple and areola.
Niacinamide Counseling: I recommended taking niacin or niacinamide, also know as vitamin B3, twice daily. Recent evidence suggests that taking vitamin B3 (500 mg twice daily) can reduce the risk of actinic keratoses and non-melanoma skin cancers. Side effects of vitamin B3 include flushing and headache.
Otezla Pregnancy And Lactation Text: This medication is Pregnancy Category C and it isn't known if it is safe during pregnancy. It is unknown if it is excreted in breast milk.
5-Fu Counseling: 5-Fluorouracil Counseling:  I discussed with the patient the risks of 5-fluorouracil including but not limited to erythema, scaling, itching, weeping, crusting, and pain.
Litfulo Pregnancy And Lactation Text: Based on animal studies, Lifulo may cause embryo-fetal harm when administered to pregnant women.  The medication should not be used in pregnancy.  Breastfeeding is not recommended during treatment.
Mirvaso Pregnancy And Lactation Text: This medication has not been assigned a Pregnancy Risk Category. It is unknown if the medication is excreted in breast milk.
Hydroxyzine Counseling: Patient advised that the medication is sedating and not to drive a car after taking this medication.  Patient informed of potential adverse effects including but not limited to dry mouth, urinary retention, and blurry vision.  The patient verbalized understanding of the proper use and possible adverse effects of hydroxyzine.  All of the patient's questions and concerns were addressed.
Bimzelx Counseling:  I discussed with the patient the risks of Bimzelx including but not limited to depression, immunosuppression, allergic reactions and infections.  The patient understands that monitoring is required including a PPD at baseline and must alert us or the primary physician if symptoms of infection or other concerning signs are noted.
Xolair Counseling:  Patient informed of potential adverse effects including but not limited to fever, muscle aches, rash and allergic reactions.  The patient verbalized understanding of the proper use and possible adverse effects of Xolair.  All of the patient's questions and concerns were addressed.
Tazorac Pregnancy And Lactation Text: This medication is not safe during pregnancy. It is unknown if this medication is excreted in breast milk.
Rhofade Counseling: Rhofade is a topical medication which can decrease superficial blood flow where applied. Side effects are uncommon and include stinging, redness and allergic reactions.
Dutasteride Male Counseling: Dustasteride Counseling:  I discussed with the patient the risks of use of dutasteride including but not limited to decreased libido, decreased ejaculate volume, and gynecomastia. Women who can become pregnant should not handle medication.  All of the patient's questions and concerns were addressed.
Ketoconazole Counseling:   Patient counseled regarding improving absorption with orange juice.  Adverse effects include but are not limited to breast enlargement, headache, diarrhea, nausea, upset stomach, liver function test abnormalities, taste disturbance, and stomach pain.  There is a rare possibility of liver failure that can occur when taking ketoconazole. The patient understands that monitoring of LFTs may be required, especially at baseline. The patient verbalized understanding of the proper use and possible adverse effects of ketoconazole.  All of the patient's questions and concerns were addressed.
Cephalexin Pregnancy And Lactation Text: This medication is Pregnancy Category B and considered safe during pregnancy.  It is also excreted in breast milk but can be used safely for shorter doses.
Minocycline Counseling: Patient advised regarding possible photosensitivity and discoloration of the teeth, skin, lips, tongue and gums.  Patient instructed to avoid sunlight, if possible.  When exposed to sunlight, patients should wear protective clothing, sunglasses, and sunscreen.  The patient was instructed to call the office immediately if the following severe adverse effects occur:  hearing changes, easy bruising/bleeding, severe headache, or vision changes.  The patient verbalized understanding of the proper use and possible adverse effects of minocycline.  All of the patient's questions and concerns were addressed.
Niacinamide Pregnancy And Lactation Text: These medications are considered safe during pregnancy.
Arava Counseling:  Patient counseled regarding adverse effects of Arava including but not limited to nausea, vomiting, abnormalities in liver function tests. Patients may develop mouth sores, rash, diarrhea, and abnormalities in blood counts. The patient understands that monitoring is required including LFTs and blood counts.  There is a rare possibility of scarring of the liver and lung problems that can occur when taking methotrexate. Persistent nausea, loss of appetite, pale stools, dark urine, cough, and shortness of breath should be reported immediately. Patient advised to discontinue Arava treatment and consult with a physician prior to attempting conception. The patient will have to undergo a treatment to eliminate Arava from the body prior to conception.
Gabapentin Counseling: I discussed with the patient the risks of gabapentin including but not limited to dizziness, somnolence, fatigue and ataxia.
Spironolactone Pregnancy And Lactation Text: This medication can cause feminization of the male fetus and should be avoided during pregnancy. The active metabolite is also found in breast milk.
VTAMA Counseling: I discussed with the patient that VTAMA is not for use in the eyes, mouth or mouth. They should call the office if they develop any signs of allergic reactions to VTAMA. The patient verbalized understanding of the proper use and possible adverse effects of VTAMA.  All of the patient's questions and concerns were addressed.
Cyclosporine Pregnancy And Lactation Text: This medication is Pregnancy Category C and it isn't know if it is safe during pregnancy. This medication is excreted in breast milk.
Bimzelx Pregnancy And Lactation Text: This medication crosses the placenta and the safety is uncertain during pregnancy. It is unknown if this medication is present in breast milk.
Azelaic Acid Counseling: Patient counseled that medicine may cause skin irritation and to avoid applying near the eyes.  In the event of skin irritation, the patient was advised to reduce the amount of the drug applied or use it less frequently.   The patient verbalized understanding of the proper use and possible adverse effects of azelaic acid.  All of the patient's questions and concerns were addressed.
Dutasteride Female Counseling: Dutasteride Counseling:  I discussed with the patient the risks of use of dutasteride including but not limited to decreased libido and sexual dysfunction. Explained the teratogenic nature of the medication and stressed the importance of not getting pregnant during treatment. All of the patient's questions and concerns were addressed.
Infliximab Counseling:  I discussed with the patient the risks of infliximab including but not limited to myelosuppression, immunosuppression, autoimmune hepatitis, demyelinating diseases, lymphoma, and serious infections.  The patient understands that monitoring is required including a PPD at baseline and must alert us or the primary physician if symptoms of infection or other concerning signs are noted.
Skyrizi Counseling: I discussed with the patient the risks of risankizumab-rzaa including but not limited to immunosuppression, and serious infections.  The patient understands that monitoring is required including a PPD at baseline and must alert us or the primary physician if symptoms of infection or other concerning signs are noted.
Oxybutynin Counseling:  I discussed with the patient the risks of oxybutynin including but not limited to skin rash, drowsiness, dry mouth, difficulty urinating, and blurred vision.
Xolair Pregnancy And Lactation Text: This medication is Pregnancy Category B and is considered safe during pregnancy. This medication is excreted in breast milk.
Bexarotene Counseling:  I discussed with the patient the risks of bexarotene including but not limited to hair loss, dry lips/skin/eyes, liver abnormalities, hyperlipidemia, pancreatitis, depression/suicidal ideation, photosensitivity, drug rash/allergic reactions, hypothyroidism, anemia, leukopenia, infection, cataracts, and teratogenicity.  Patient understands that they will need regular blood tests to check lipid profile, liver function tests, white blood cell count, thyroid function tests and pregnancy test if applicable.
Hydroxyzine Pregnancy And Lactation Text: This medication is not safe during pregnancy and should not be taken. It is also excreted in breast milk and breast feeding isn't recommended.
Tranexamic Acid Pregnancy And Lactation Text: It is unknown if this medication is safe during pregnancy or breast feeding.
Detail Level: Simple
Clindamycin Counseling: I counseled the patient regarding use of clindamycin as an antibiotic for prophylactic and/or therapeutic purposes. Clindamycin is active against numerous classes of bacteria, including skin bacteria. Side effects may include nausea, diarrhea, gastrointestinal upset, rash, hives, yeast infections, and in rare cases, colitis.
Topical Clindamycin Counseling: Patient counseled that this medication may cause skin irritation or allergic reactions.  In the event of skin irritation, the patient was advised to reduce the amount of the drug applied or use it less frequently.   The patient verbalized understanding of the proper use and possible adverse effects of clindamycin.  All of the patient's questions and concerns were addressed.
Ketoconazole Pregnancy And Lactation Text: This medication is Pregnancy Category C and it isn't know if it is safe during pregnancy. It is also excreted in breast milk and breast feeding isn't recommended.
Imiquimod Counseling:  I discussed with the patient the risks of imiquimod including but not limited to erythema, scaling, itching, weeping, crusting, and pain.  Patient understands that the inflammatory response to imiquimod is variable from person to person and was educated regarded proper titration schedule.  If flu-like symptoms develop, patient knows to discontinue the medication and contact us.
Opzelura Counseling:  I discussed with the patient the risks of Opzelura including but not limited to nasopharngitis, bronchitis, ear infection, eosinophila, hives, diarrhea, folliculitis, tonsillitis, and rhinorrhea.  Taken orally, this medication has been linked to serious infections; higher rate of mortality; malignancy and lymphoproliferative disorders; major adverse cardiovascular events; thrombosis; thrombocytopenia, anemia, and neutropenia; and lipid elevations.
Olumiant Counseling: I discussed with the patient the risks of Olumiant therapy including but not limited to upper respiratory tract infections, shingles, cold sores, and nausea. Live vaccines should be avoided.  This medication has been linked to serious infections; higher rate of mortality; malignancy and lymphoproliferative disorders; major adverse cardiovascular events; thrombosis; gastrointestinal perforations; neutropenia; lymphopenia; anemia; liver enzyme elevations; and lipid elevations.
Vtama Pregnancy And Lactation Text: It is unknown if this medication can cause problems during pregnancy and breastfeeding.
Topical Steroids Counseling: I discussed with the patient that prolonged use of topical steroids can result in the increased appearance of superficial blood vessels (telangiectasias), lightening (hypopigmentation) and thinning of the skin (atrophy).  Patient understands to avoid using high potency steroids in skin folds, the groin or the face.  The patient verbalized understanding of the proper use and possible adverse effects of topical steroids.  All of the patient's questions and concerns were addressed.
Methotrexate Counseling:  Patient counseled regarding adverse effects of methotrexate including but not limited to nausea, vomiting, abnormalities in liver function tests. Patients may develop mouth sores, rash, diarrhea, and abnormalities in blood counts. The patient understands that monitoring is required including LFT's and blood counts.  There is a rare possibility of scarring of the liver and lung problems that can occur when taking methotrexate. Persistent nausea, loss of appetite, pale stools, dark urine, cough, and shortness of breath should be reported immediately. Patient advised to discontinue methotrexate treatment at least three months before attempting to become pregnant.  I discussed the need for folate supplements while taking methotrexate.  These supplements can decrease side effects during methotrexate treatment. The patient verbalized understanding of the proper use and possible adverse effects of methotrexate.  All of the patient's questions and concerns were addressed.
Humira Counseling:  I discussed with the patient the risks of adalimumab including but not limited to myelosuppression, immunosuppression, autoimmune hepatitis, demyelinating diseases, lymphoma, and serious infections.  The patient understands that monitoring is required including a PPD at baseline and must alert us or the primary physician if symptoms of infection or other concerning signs are noted.
Oxybutynin Pregnancy And Lactation Text: This medication is Pregnancy Category B and is considered safe during pregnancy. It is unknown if it is excreted in breast milk.
Nsaids Counseling: NSAID Counseling: I discussed with the patient that NSAIDs should be taken with food. Prolonged use of NSAIDs can result in the development of stomach ulcers.  Patient advised to stop taking NSAIDs if abdominal pain occurs.  The patient verbalized understanding of the proper use and possible adverse effects of NSAIDs.  All of the patient's questions and concerns were addressed.
Cimzia Counseling:  I discussed with the patient the risks of Cimzia including but not limited to immunosuppression, allergic reactions and infections.  The patient understands that monitoring is required including a PPD at baseline and must alert us or the primary physician if symptoms of infection or other concerning signs are noted.
Solaraze Counseling:  I discussed with the patient the risks of Solaraze including but not limited to erythema, scaling, itching, weeping, crusting, and pain.
Opzelura Pregnancy And Lactation Text: There is insufficient data to evaluate drug-associated risk for major birth defects, miscarriage, or other adverse maternal or fetal outcomes.  There is a pregnancy registry that monitors pregnancy outcomes in pregnant persons exposed to the medication during pregnancy.  It is unknown if this medication is excreted in breast milk.  Do not breastfeed during treatment and for about 4 weeks after the last dose.
Dutasteride Pregnancy And Lactation Text: This medication is absolutely contraindicated in women, especially during pregnancy and breast feeding. Feminization of male fetuses is possible if taking while pregnant.
Olumiant Pregnancy And Lactation Text: Based on animal studies, Olumiant may cause embryo-fetal harm when administered to pregnant women.  The medication should not be used in pregnancy.  Breastfeeding is not recommended during treatment.
Azelaic Acid Pregnancy And Lactation Text: This medication is considered safe during pregnancy and breast feeding.
Drysol Counseling:  I discussed with the patient the risks of drysol/aluminum chloride including but not limited to skin rash, itching, irritation, burning.
Methotrexate Pregnancy And Lactation Text: This medication is Pregnancy Category X and is known to cause fetal harm. This medication is excreted in breast milk.
Quinolones Counseling:  I discussed with the patient the risks of fluoroquinolones including but not limited to GI upset, allergic reaction, drug rash, diarrhea, dizziness, photosensitivity, yeast infections, liver function test abnormalities, tendonitis/tendon rupture.
Topical Steroids Applications Pregnancy And Lactation Text: Most topical steroids are considered safe to use during pregnancy and lactation.  Any topical steroid applied to the breast or nipple should be washed off before breastfeeding.
Terbinafine Counseling: Patient counseling regarding adverse effects of terbinafine including but not limited to headache, diarrhea, rash, upset stomach, liver function test abnormalities, itching, taste/smell disturbance, nausea, abdominal pain, and flatulence.  There is a rare possibility of liver failure that can occur when taking terbinafine.  The patient understands that a baseline LFT and kidney function test may be required. The patient verbalized understanding of the proper use and possible adverse effects of terbinafine.  All of the patient's questions and concerns were addressed.
Bexarotene Pregnancy And Lactation Text: This medication is Pregnancy Category X and should not be given to women who are pregnant or may become pregnant. This medication should not be used if you are breast feeding.
Clindamycin Pregnancy And Lactation Text: This medication can be used in pregnancy if certain situations. Clindamycin is also present in breast milk.
Valtrex Counseling: I discussed with the patient the risks of valacyclovir including but not limited to kidney damage, nausea, vomiting and severe allergy.  The patient understands that if the infection seems to be worsening or is not improving, they are to call.
Clofazimine Counseling:  I discussed with the patient the risks of clofazimine including but not limited to skin and eye pigmentation, liver damage, nausea/vomiting, gastrointestinal bleeding and allergy.
Erivedge Counseling- I discussed with the patient the risks of Erivedge including but not limited to nausea, vomiting, diarrhea, constipation, weight loss, changes in the sense of taste, decreased appetite, muscle spasms, and hair loss.  The patient verbalized understanding of the proper use and possible adverse effects of Erivedge.  All of the patient's questions and concerns were addressed.
Zoryve Counseling:  I discussed with the patient that Zoryve is not for use in the eyes, mouth or vagina. The most commonly reported side effects include diarrhea, headache, insomnia, application site pain, upper respiratory tract infections, and urinary tract infections.  All of the patient's questions and concerns were addressed.
Albendazole Counseling:  I discussed with the patient the risks of albendazole including but not limited to cytopenia, kidney damage, nausea/vomiting and severe allergy.  The patient understands that this medication is being used in an off-label manner.
Azathioprine Counseling:  I discussed with the patient the risks of azathioprine including but not limited to myelosuppression, immunosuppression, hepatotoxicity, lymphoma, and infections.  The patient understands that monitoring is required including baseline LFTs, Creatinine, possible TPMP genotyping and weekly CBCs for the first month and then every 2 weeks thereafter.  The patient verbalized understanding of the proper use and possible adverse effects of azathioprine.  All of the patient's questions and concerns were addressed.
Stelara Counseling:  I discussed with the patient the risks of ustekinumab including but not limited to immunosuppression, malignancy, posterior leukoencephalopathy syndrome, and serious infections.  The patient understands that monitoring is required including a PPD at baseline and must alert us or the primary physician if symptoms of infection or other concerning signs are noted.
Finasteride Male Counseling: Finasteride Counseling:  I discussed with the patient the risks of use of finasteride including but not limited to decreased libido, decreased ejaculate volume, gynecomastia, and depression. Women should not handle medication.  All of the patient's questions and concerns were addressed.
Picato Counseling:  I discussed with the patient the risks of Picato including but not limited to erythema, scaling, itching, weeping, crusting, and pain.
Solaraze Pregnancy And Lactation Text: This medication is Pregnancy Category B and is considered safe. There is some data to suggest avoiding during the third trimester. It is unknown if this medication is excreted in breast milk.
Nsaids Pregnancy And Lactation Text: These medications are considered safe up to 30 weeks gestation. It is excreted in breast milk.
Propranolol Counseling:  I discussed with the patient the risks of propranolol including but not limited to low heart rate, low blood pressure, low blood sugar, restlessness and increased cold sensitivity. They should call the office if they experience any of these side effects.
Rituxan Counseling:  I discussed with the patient the risks of Rituxan infusions. Side effects can include infusion reactions, severe drug rashes including mucocutaneous reactions, reactivation of latent hepatitis and other infections and rarely progressive multifocal leukoencephalopathy.  All of the patient's questions and concerns were addressed.
Benzoyl Peroxide Counseling: Patient counseled that medicine may cause skin irritation and bleach clothing.  In the event of skin irritation, the patient was advised to reduce the amount of the drug applied or use it less frequently.   The patient verbalized understanding of the proper use and possible adverse effects of benzoyl peroxide.  All of the patient's questions and concerns were addressed.
Glycopyrrolate Counseling:  I discussed with the patient the risks of glycopyrrolate including but not limited to skin rash, drowsiness, dry mouth, difficulty urinating, and blurred vision.
Topical Ketoconazole Counseling: Patient counseled that this medication may cause skin irritation or allergic reactions.  In the event of skin irritation, the patient was advised to reduce the amount of the drug applied or use it less frequently.   The patient verbalized understanding of the proper use and possible adverse effects of ketoconazole.  All of the patient's questions and concerns were addressed.
Isotretinoin Counseling: Patient should get monthly blood tests, not donate blood, not drive at night if vision affected, not share medication, and not undergo elective surgery for 6 months after tx completed. Side effects reviewed, pt to contact office should one occur.
Doxycycline Counseling:  Patient counseled regarding possible photosensitivity and increased risk for sunburn.  Patient instructed to avoid sunlight, if possible.  When exposed to sunlight, patients should wear protective clothing, sunglasses, and sunscreen.  The patient was instructed to call the office immediately if the following severe adverse effects occur:  hearing changes, easy bruising/bleeding, severe headache, or vision changes.  The patient verbalized understanding of the proper use and possible adverse effects of doxycycline.  All of the patient's questions and concerns were addressed.
Rinvoq Counseling: I discussed with the patient the risks of Rinvoq therapy including but not limited to upper respiratory tract infections, shingles, cold sores, bronchitis, nausea, cough, fever, acne, and headache. Live vaccines should be avoided.  This medication has been linked to serious infections; higher rate of mortality; malignancy and lymphoproliferative disorders; major adverse cardiovascular events; thrombosis; thrombocytopenia, anemia, and neutropenia; lipid elevations; liver enzyme elevations; and gastrointestinal perforations.
Cimzia Pregnancy And Lactation Text: This medication crosses the placenta but can be considered safe in certain situations. Cimzia may be excreted in breast milk.
Prednisone Counseling:  I discussed with the patient the risks of prolonged use of prednisone including but not limited to weight gain, insomnia, osteoporosis, mood changes, diabetes, susceptibility to infection, glaucoma and high blood pressure.  In cases where prednisone use is prolonged, patients should be monitored with blood pressure checks, serum glucose levels and an eye exam.  Additionally, the patient may need to be placed on GI prophylaxis, PCP prophylaxis, and calcium and vitamin D supplementation and/or a bisphosphonate.  The patient verbalized understanding of the proper use and the possible adverse effects of prednisone.  All of the patient's questions and concerns were addressed.
Topical Sulfur Applications Counseling: Topical Sulfur Counseling: Patient counseled that this medication may cause skin irritation or allergic reactions.  In the event of skin irritation, the patient was advised to reduce the amount of the drug applied or use it less frequently.   The patient verbalized understanding of the proper use and possible adverse effects of topical sulfur application.  All of the patient's questions and concerns were addressed.
Hyrimoz Counseling:  I discussed with the patient the risks of adalimumab including but not limited to myelosuppression, immunosuppression, autoimmune hepatitis, demyelinating diseases, lymphoma, and serious infections.  The patient understands that monitoring is required including a PPD at baseline and must alert us or the primary physician if symptoms of infection or other concerning signs are noted.
Fluconazole Counseling:  Patient counseled regarding adverse effects of fluconazole including but not limited to headache, diarrhea, nausea, upset stomach, liver function test abnormalities, taste disturbance, and stomach pain.  There is a rare possibility of liver failure that can occur when taking fluconazole.  The patient understands that monitoring of LFTs and kidney function test may be required, especially at baseline. The patient verbalized understanding of the proper use and possible adverse effects of fluconazole.  All of the patient's questions and concerns were addressed.
Benzoyl Peroxide Pregnancy And Lactation Text: This medication is Pregnancy Category C. It is unknown if benzoyl peroxide is excreted in breast milk.
Valtrex Pregnancy And Lactation Text: this medication is Pregnancy Category B and is considered safe during pregnancy. This medication is not directly found in breast milk but it's metabolite acyclovir is present.
Olanzapine Counseling- I discussed with the patient the common side effects of olanzapine including but are not limited to: lack of energy, dry mouth, increased appetite, sleepiness, tremor, constipation, dizziness, changes in behavior, or restlessness.  Explained that teenagers are more likely to experience headaches, abdominal pain, pain in the arms or legs, tiredness, and sleepiness.  Serious side effects include but are not limited: increased risk of death in elderly patients who are confused, have memory loss, or dementia-related psychosis; hyperglycemia; increased cholesterol and triglycerides; and weight gain.
Propranolol Pregnancy And Lactation Text: This medication is Pregnancy Category C and it isn't known if it is safe during pregnancy. It is excreted in breast milk.
Klisyri Counseling:  I discussed with the patient the risks of Klisyri including but not limited to erythema, scaling, itching, weeping, crusting, and pain.
Glycopyrrolate Pregnancy And Lactation Text: This medication is Pregnancy Category B and is considered safe during pregnancy. It is unknown if it is excreted breast milk.
Elidel Counseling: Patient may experience a mild burning sensation during topical application. Elidel is not approved in children less than 2 years of age. There have been case reports of hematologic and skin malignancies in patients using topical calcineurin inhibitors although causality is questionable.
Rituxan Pregnancy And Lactation Text: This medication is Pregnancy Category C and it isn't know if it is safe during pregnancy. It is unknown if this medication is excreted in breast milk but similar antibodies are known to be excreted.
Rinvoq Pregnancy And Lactation Text: Based on animal studies, Rinvoq may cause embryo-fetal harm when administered to pregnant women.  The medication should not be used in pregnancy.  Breastfeeding is not recommended during treatment and for 6 days after the last dose.
Azithromycin Counseling:  I discussed with the patient the risks of azithromycin including but not limited to GI upset, allergic reaction, drug rash, diarrhea, and yeast infections.
Finasteride Female Counseling: Finasteride Counseling:  I discussed with the patient the risks of use of finasteride including but not limited to decreased libido and sexual dysfunction. Explained the teratogenic nature of the medication and stressed the importance of not getting pregnant during treatment. All of the patient's questions and concerns were addressed.
Cosentyx Counseling:  I discussed with the patient the risks of Cosentyx including but not limited to worsening of Crohn's disease, immunosuppression, allergic reactions and infections.  The patient understands that monitoring is required including a PPD at baseline and must alert us or the primary physician if symptoms of infection or other concerning signs are noted.
Ivermectin Counseling:  Patient instructed to take medication on an empty stomach with a full glass of water.  Patient informed of potential adverse effects including but not limited to nausea, diarrhea, dizziness, itching, and swelling of the extremities or lymph nodes.  The patient verbalized understanding of the proper use and possible adverse effects of ivermectin.  All of the patient's questions and concerns were addressed.
Isotretinoin Pregnancy And Lactation Text: This medication is Pregnancy Category X and is considered extremely dangerous during pregnancy. It is unknown if it is excreted in breast milk.
Doxycycline Pregnancy And Lactation Text: This medication is Pregnancy Category D and not consider safe during pregnancy. It is also excreted in breast milk but is considered safe for shorter treatment courses.
Soolantra Counseling: I discussed with the patients the risks of topial Soolantra. This is a medicine which decreases the number of mites and inflammation in the skin. You experience burning, stinging, eye irritation or allergic reactions.  Please call our office if you develop any problems from using this medication.
Rifampin Counseling: I discussed with the patient the risks of rifampin including but not limited to liver damage, kidney damage, red-orange body fluids, nausea/vomiting and severe allergy.
Colchicine Counseling:  Patient counseled regarding adverse effects including but not limited to stomach upset (nausea, vomiting, stomach pain, or diarrhea).  Patient instructed to limit alcohol consumption while taking this medication.  Colchicine may reduce blood counts especially with prolonged use.  The patient understands that monitoring of kidney function and blood counts may be required, especially at baseline. The patient verbalized understanding of the proper use and possible adverse effects of colchicine.  All of the patient's questions and concerns were addressed.
Olanzapine Pregnancy And Lactation Text: This medication is pregnancy category C.   There are no adequate and well controlled trials with olanzapine in pregnant females.  Olanzapine should be used during pregnancy only if the potential benefit justifies the potential risk to the fetus.   In a study in lactating healthy women, olanzapine was excreted in breast milk.  It is recommended that women taking olanzapine should not breast feed.
Libtayo Counseling- I discussed with the patient the risks of Libtayo including but not limited to nausea, vomiting, diarrhea, and bone or muscle pain.  The patient verbalized understanding of the proper use and possible adverse effects of Libtayo.  All of the patient's questions and concerns were addressed.
Hydroxychloroquine Counseling:  I discussed with the patient that a baseline ophthalmologic exam is needed at the start of therapy and every year thereafter while on therapy. A CBC may also be warranted for monitoring.  The side effects of this medication were discussed with the patient, including but not limited to agranulocytosis, aplastic anemia, seizures, rashes, retinopathy, and liver toxicity. Patient instructed to call the office should any adverse effect occur.  The patient verbalized understanding of the proper use and possible adverse effects of Plaquenil.  All the patient's questions and concerns were addressed.
Cellcept Counseling:  I discussed with the patient the risks of mycophenolate mofetil including but not limited to infection/immunosuppression, GI upset, hypokalemia, hypercholesterolemia, bone marrow suppression, lymphoproliferative disorders, malignancy, GI ulceration/bleed/perforation, colitis, interstitial lung disease, kidney failure, progressive multifocal leukoencephalopathy, and birth defects.  The patient understands that monitoring is required including a baseline creatinine and regular CBC testing. In addition, patient must alert us immediately if symptoms of infection or other concerning signs are noted.
Zyclara Counseling:  I discussed with the patient the risks of imiquimod including but not limited to erythema, scaling, itching, weeping, crusting, and pain.  Patient understands that the inflammatory response to imiquimod is variable from person to person and was educated regarded proper titration schedule.  If flu-like symptoms develop, patient knows to discontinue the medication and contact us.
Topical Sulfur Applications Pregnancy And Lactation Text: This medication is Pregnancy Category C and has an unknown safety profile during pregnancy. It is unknown if this topical medication is excreted in breast milk.

## 2024-05-13 ENCOUNTER — RX ONLY (OUTPATIENT)
Age: 74
Setting detail: RX ONLY
End: 2024-05-13

## 2024-05-13 RX ORDER — METHOTREXATE SODIUM 2.5 MG/1
TABLET ORAL QWEEK
Qty: 72 | Refills: 1 | Status: ERX

## 2024-05-13 RX ORDER — FOLIC ACID 1 MG/1
TABLET ORAL
Qty: 90 | Refills: 1 | Status: ERX

## 2024-07-25 ENCOUNTER — APPOINTMENT (RX ONLY)
Dept: URBAN - METROPOLITAN AREA CLINIC 236 | Facility: CLINIC | Age: 74
Setting detail: DERMATOLOGY
End: 2024-07-25

## 2024-07-25 DIAGNOSIS — L21.8 OTHER SEBORRHEIC DERMATITIS: ICD-10-CM

## 2024-07-25 DIAGNOSIS — C86.6 PRIMARY CUTANEOUS CD30-POSITIVE T-CELL PROLIFERATIONS: ICD-10-CM | Status: WELL CONTROLLED

## 2024-07-25 PROCEDURE — ? PRESCRIPTION MEDICATION MANAGEMENT

## 2024-07-25 PROCEDURE — 99214 OFFICE O/P EST MOD 30 MIN: CPT

## 2024-07-25 PROCEDURE — ? MDM - TREATMENT GOALS

## 2024-07-25 PROCEDURE — ? PRESCRIPTION

## 2024-07-25 PROCEDURE — ? MEDICATION COUNSELING

## 2024-07-25 PROCEDURE — ? COUNSELING

## 2024-07-25 RX ORDER — KETOCONAZOLE 20 MG/ML
SHAMPOO, SUSPENSION TOPICAL TIW
Qty: 120 | Refills: 5 | Status: ERX

## 2024-07-25 RX ORDER — TRIAMCINOLONE ACETONIDE 1 MG/G
CREAM TOPICAL
Qty: 453.6 | Refills: 5 | Status: ERX

## 2024-07-25 ASSESSMENT — LOCATION DETAILED DESCRIPTION DERM
LOCATION DETAILED: SUPERIOR MID FOREHEAD
LOCATION DETAILED: LEFT LATERAL ABDOMEN
LOCATION DETAILED: LEFT SUPERIOR LATERAL LOWER BACK
LOCATION DETAILED: RIGHT ANTERIOR PROXIMAL THIGH
LOCATION DETAILED: LEFT ANTERIOR PROXIMAL THIGH

## 2024-07-25 ASSESSMENT — LOCATION ZONE DERM
LOCATION ZONE: LEG
LOCATION ZONE: FACE
LOCATION ZONE: TRUNK

## 2024-07-25 ASSESSMENT — LOCATION SIMPLE DESCRIPTION DERM
LOCATION SIMPLE: RIGHT THIGH
LOCATION SIMPLE: SUPERIOR FOREHEAD
LOCATION SIMPLE: LEFT THIGH
LOCATION SIMPLE: ABDOMEN
LOCATION SIMPLE: LEFT LOWER BACK

## 2024-07-25 ASSESSMENT — SEVERITY ASSESSMENT: SEVERITY: ALMOST CLEAR

## 2024-07-25 ASSESSMENT — PAIN INTENSITY VAS: HOW INTENSE IS YOUR PAIN 0 BEING NO PAIN, 10 BEING THE MOST SEVERE PAIN POSSIBLE?: NO PAIN

## 2024-07-25 ASSESSMENT — BSA RASH: BSA RASH: 1

## 2024-07-25 NOTE — PROCEDURE: PRESCRIPTION MEDICATION MANAGEMENT
Render In Strict Bullet Format?: No
Plan: Advised to avoid NSAIDs while on Methotrexate \\n\\nTake folic acid daily.\\n\\nHad recent blood work done by PCP and Heme all within normal limits. \\n\\nFollow up with oncology yearly
Continue Regimen: Triamcinolone to affected areas 3x/week
Detail Level: Zone
Modify Regimen: Methotrexate take 4 tabs q week
Continue Regimen: ketoconazole 2 % shampoo TIW\\nQuantity: 120.0 ml  Days Supply: 30\\nSig: apply to  the scalp 2-3x a week. let it sit for 5 minutes before you rinse .

## 2024-10-16 ENCOUNTER — RX ONLY (OUTPATIENT)
Age: 74
Setting detail: RX ONLY
End: 2024-10-16

## 2024-10-16 RX ORDER — FOLIC ACID 1 MG/1
TABLET ORAL
Qty: 90 | Refills: 1 | Status: ERX

## 2024-10-16 RX ORDER — METHOTREXATE SODIUM 2.5 MG/1
TABLET ORAL QWEEK
Qty: 72 | Refills: 1 | Status: ERX

## 2024-10-24 ENCOUNTER — APPOINTMENT (RX ONLY)
Dept: URBAN - METROPOLITAN AREA CLINIC 236 | Facility: CLINIC | Age: 74
Setting detail: DERMATOLOGY
End: 2024-10-24

## 2024-10-24 DIAGNOSIS — D485 NEOPLASM OF UNCERTAIN BEHAVIOR OF SKIN: ICD-10-CM

## 2024-10-24 DIAGNOSIS — L21.8 OTHER SEBORRHEIC DERMATITIS: ICD-10-CM

## 2024-10-24 DIAGNOSIS — C86.6 PRIMARY CUTANEOUS CD30-POSITIVE T-CELL PROLIFERATIONS: ICD-10-CM

## 2024-10-24 PROBLEM — D48.5 NEOPLASM OF UNCERTAIN BEHAVIOR OF SKIN: Status: ACTIVE | Noted: 2024-10-24

## 2024-10-24 PROBLEM — C86.60 PRIMARY CUTANEOUS CD30-POSITIVE T-CELL PROLIFERATIONS NOT HAVING ACHIEVED REMISSION: Status: ACTIVE | Noted: 2024-10-24

## 2024-10-24 PROCEDURE — ? MEDICATION COUNSELING

## 2024-10-24 PROCEDURE — ? BIOPSY BY SHAVE METHOD

## 2024-10-24 PROCEDURE — ? LAB REPORTS REVIEWED

## 2024-10-24 PROCEDURE — ? PRESCRIPTION

## 2024-10-24 PROCEDURE — ? COUNSELING

## 2024-10-24 PROCEDURE — ? PRESCRIPTION MEDICATION MANAGEMENT

## 2024-10-24 PROCEDURE — 99214 OFFICE O/P EST MOD 30 MIN: CPT | Mod: 25

## 2024-10-24 PROCEDURE — 11102 TANGNTL BX SKIN SINGLE LES: CPT

## 2024-10-24 RX ORDER — KETOCONAZOLE 20 MG/ML
SHAMPOO, SUSPENSION TOPICAL TIW
Qty: 120 | Refills: 5 | Status: ACTIVE

## 2024-10-24 RX ORDER — TRIAMCINOLONE ACETONIDE 1 MG/G
CREAM TOPICAL
Qty: 453.6 | Refills: 5 | Status: ACTIVE

## 2024-10-24 ASSESSMENT — LOCATION DETAILED DESCRIPTION DERM
LOCATION DETAILED: LEFT SUPERIOR LATERAL LOWER BACK
LOCATION DETAILED: LEFT ANTERIOR PROXIMAL THIGH
LOCATION DETAILED: RIGHT SUPERIOR FOREHEAD
LOCATION DETAILED: SUPERIOR MID FOREHEAD
LOCATION DETAILED: RIGHT ANTERIOR PROXIMAL THIGH
LOCATION DETAILED: LEFT LATERAL ABDOMEN

## 2024-10-24 ASSESSMENT — LOCATION SIMPLE DESCRIPTION DERM
LOCATION SIMPLE: ABDOMEN
LOCATION SIMPLE: SUPERIOR FOREHEAD
LOCATION SIMPLE: LEFT THIGH
LOCATION SIMPLE: LEFT LOWER BACK
LOCATION SIMPLE: RIGHT THIGH
LOCATION SIMPLE: RIGHT FOREHEAD

## 2024-10-24 ASSESSMENT — SEVERITY ASSESSMENT
SEVERITY: MILD
HOW SEVERE IS THIS PATIENT'S CONDITION?: MODERATE

## 2024-10-24 ASSESSMENT — LOCATION ZONE DERM
LOCATION ZONE: FACE
LOCATION ZONE: LEG
LOCATION ZONE: TRUNK

## 2024-10-24 NOTE — PROCEDURE: PRESCRIPTION MEDICATION MANAGEMENT
Render In Strict Bullet Format?: No
Plan: Advised to avoid NSAIDs while on Methotrexate \\n\\nTake folic acid daily.\\n\\nFollow up with oncology yearly
Continue Regimen: Triamcinolone to affected areas 3x/week
Detail Level: Zone
Modify Regimen: Methotrexate take 4 tabs q week
Continue Regimen: ketoconazole 2 % shampoo TIW\\nQuantity: 120.0 ml  Days Supply: 30\\nSig: apply to  the scalp 2-3x a week. let it sit for 5 minutes before you rinse .

## 2024-12-02 ENCOUNTER — APPOINTMENT (RX ONLY)
Dept: URBAN - METROPOLITAN AREA CLINIC 236 | Facility: CLINIC | Age: 74
Setting detail: DERMATOLOGY
End: 2024-12-02

## 2024-12-02 PROBLEM — D04.39 CARCINOMA IN SITU OF SKIN OF OTHER PARTS OF FACE: Status: ACTIVE | Noted: 2024-12-02

## 2024-12-02 PROCEDURE — ? MOHS SURGERY

## 2024-12-02 PROCEDURE — 13132 CMPLX RPR F/C/C/M/N/AX/G/H/F: CPT

## 2024-12-02 PROCEDURE — 17311 MOHS 1 STAGE H/N/HF/G: CPT

## 2024-12-02 PROCEDURE — ? COUNSELING

## 2024-12-02 NOTE — PROCEDURE: MOHS SURGERY
Body Location Override (Optional - Billing Will Still Be Based On Selected Body Map Location If Applicable): right superior forehead
Mohs Case Number: 
Date Of Previous Biopsy (Optional): 10/242/2024
Previous Accession (Optional): [id number]
Biopsy Photograph Reviewed: Yes
Referring Physician (Optional): Dr. Chandan Navarro
Consent Type: Consent 1 (Standard)
Eye Shield Used: No
Surgeon Performing Repair (Optional): Dr. Bridger Marie
Initial Size Of Lesion: 0.7
Number Of Stages: 1
Primary Defect Length In Cm (Final Defect Size - Required For Flaps/Grafts): 0
Repair Type: Complex Repair
Which Instrument Did You Use For Dermabrasion?: Wire Brush
Which Eyelid Repair Cpt Are You Using?: 99550
Oculoplastic Surgeon Procedure Text (A): After obtaining clear surgical margins the patient was sent to oculoplastics for surgical repair.  The patient understands they will receive post-surgical care and follow-up from the referring physician's office.
Otolaryngologist Procedure Text (A): After obtaining clear surgical margins the patient was sent to otolaryngology for surgical repair.  The patient understands they will receive post-surgical care and follow-up from the referring physician's office.
Plastic Surgeon Procedure Text (A): After obtaining clear surgical margins the patient was sent to plastics for surgical repair.  The patient understands they will receive post-surgical care and follow-up from the referring physician's office.
Mid-Level Procedure Text (A): After obtaining clear surgical margins the patient was sent to a mid-level provider for surgical repair.  The patient understands they will receive post-surgical care and follow-up from the mid-level provider.
Provider Procedure Text (A): After obtaining clear surgical margins the defect was repaired by another provider.
Asc Procedure Text (A): After obtaining clear surgical margins the patient was sent to an ASC for surgical repair.  The patient understands they will receive post-surgical care and follow-up from the ASC physician.
Simple / Intermediate / Complex Repair - Final Wound Length In Cm: 2.7
Suturegard Retention Suture: 2-0 Nylon
Retention Suture Bite Size: 3 mm
Length To Time In Minutes Device Was In Place: 10
Width Of Defect Perpendicular To Closure In Cm (Required): 0.9
Undermining Type: One Wound Edge
Debridement Text: The wound edges were debrided prior to proceeding with the closure to facilitate wound healing.
Helical Rim Text: The closure involved the helical rim.
Vermilion Border Text: The closure involved the vermilion border.
Nostril Rim Text: The closure involved the nostril rim.
Retention Suture Text: Retention sutures were placed to support the closure and prevent dehiscence.
Location Indication Override (Is Already Calculated Based On Selected Body Location): Area M
Area H Indication Text: Tumors in this location are included in Area H (eyelids, eyebrows, nose, lips, chin, ear, pre-auricular, post-auricular, temple, genitalia, hands, feet, ankles and areola).  Tissue conservation is critical in these anatomic locations.
Area M Indication Text: Tumors in this location are included in Area M (cheek, forehead, scalp, neck, jawline and pretibial skin).  Mohs surgery is indicated for tumors in these anatomic locations.
Area L Indication Text: Tumors in this location are included in Area L (trunk and extremities).  Mohs surgery is indicated for larger tumors, or tumors with aggressive histologic features, in these anatomic locations.
Tumor Debulked?: curette
Perineural Invasion (For Histology - Be Specific If Possible): absent
Special Stains Stage 1 - Results: Base On Clearance Noted Above
Stage 2: Additional Anesthesia Type: 1% lidocaine with epinephrine
Include Tumor Staging In Mohs Note?: Please Select the Appropriate Response
Staging Info: By selecting yes to the question above you will include information on AJCC 8 tumor staging in your Mohs note. Information on tumor staging will be automatically added for SCCs on the head and neck. AJCC 8 includes tumor size, tumor depth, perineural involvement and bone invasion.
Tumor Depth: Less than 6mm from granular layer and no invasion beyond the subcutaneous fat
Medical Necessity Statement: Based on my medical judgement, Mohs surgery is the most appropriate treatment for this cancer compared to other treatments.
Alternatives Discussed Intro (Do Not Add Period): I discussed alternative treatments to Mohs surgery and specifically discussed the risks and benefits of
Consent 1/Introductory Paragraph: The rationale for Mohs was explained to the patient and consent was obtained. The risks, benefits and alternatives to therapy were discussed in detail. Specifically, the risks of infection, scarring, bleeding, prolonged wound healing, incomplete removal, allergy to anesthesia, nerve injury and recurrence were addressed. Prior to the procedure, the treatment site was clearly identified and confirmed by the patient. All components of Universal Protocol/PAUSE Rule completed.
Consent 2/Introductory Paragraph: Mohs surgery was explained to the patient and consent was obtained. The risks, benefits and alternatives to therapy were discussed in detail. Specifically, the risks of infection, scarring, bleeding, prolonged wound healing, incomplete removal, allergy to anesthesia, nerve injury and recurrence were addressed. Prior to the procedure, the treatment site was clearly identified and confirmed by the patient. All components of Universal Protocol/PAUSE Rule completed.
Consent 3/Introductory Paragraph: I gave the patient a chance to ask questions they had about the procedure.  Following this I explained the Mohs procedure and consent was obtained. The risks, benefits and alternatives to therapy were discussed in detail. Specifically, the risks of infection, scarring, bleeding, prolonged wound healing, incomplete removal, allergy to anesthesia, nerve injury and recurrence were addressed. Prior to the procedure, the treatment site was clearly identified and confirmed by the patient. All components of Universal Protocol/PAUSE Rule completed.
Consent (Temporal Branch)/Introductory Paragraph: The rationale for Mohs was explained to the patient and consent was obtained. The risks, benefits and alternatives to therapy were discussed in detail. Specifically, the risks of damage to the temporal branch of the facial nerve, infection, scarring, bleeding, prolonged wound healing, incomplete removal, allergy to anesthesia, and recurrence were addressed. Prior to the procedure, the treatment site was clearly identified and confirmed by the patient. All components of Universal Protocol/PAUSE Rule completed.
Consent (Marginal Mandibular)/Introductory Paragraph: The rationale for Mohs was explained to the patient and consent was obtained. The risks, benefits and alternatives to therapy were discussed in detail. Specifically, the risks of damage to the marginal mandibular branch of the facial nerve, infection, scarring, bleeding, prolonged wound healing, incomplete removal, allergy to anesthesia, and recurrence were addressed. Prior to the procedure, the treatment site was clearly identified and confirmed by the patient. All components of Universal Protocol/PAUSE Rule completed.
Consent (Spinal Accessory)/Introductory Paragraph: The rationale for Mohs was explained to the patient and consent was obtained. The risks, benefits and alternatives to therapy were discussed in detail. Specifically, the risks of damage to the spinal accessory nerve, infection, scarring, bleeding, prolonged wound healing, incomplete removal, allergy to anesthesia, and recurrence were addressed. Prior to the procedure, the treatment site was clearly identified and confirmed by the patient. All components of Universal Protocol/PAUSE Rule completed.
Consent (Near Eyelid Margin)/Introductory Paragraph: The rationale for Mohs was explained to the patient and consent was obtained. The risks, benefits and alternatives to therapy were discussed in detail. Specifically, the risks of ectropion or eyelid deformity, infection, scarring, bleeding, prolonged wound healing, incomplete removal, allergy to anesthesia, nerve injury and recurrence were addressed. Prior to the procedure, the treatment site was clearly identified and confirmed by the patient. All components of Universal Protocol/PAUSE Rule completed.
Consent (Ear)/Introductory Paragraph: The rationale for Mohs was explained to the patient and consent was obtained. The risks, benefits and alternatives to therapy were discussed in detail. Specifically, the risks of ear deformity, infection, scarring, bleeding, prolonged wound healing, incomplete removal, allergy to anesthesia, nerve injury and recurrence were addressed. Prior to the procedure, the treatment site was clearly identified and confirmed by the patient. All components of Universal Protocol/PAUSE Rule completed.
Consent (Nose)/Introductory Paragraph: The rationale for Mohs was explained to the patient and consent was obtained. The risks, benefits and alternatives to therapy were discussed in detail. Specifically, the risks of nasal deformity, changes in the flow of air through the nose, infection, scarring, bleeding, prolonged wound healing, incomplete removal, allergy to anesthesia, nerve injury and recurrence were addressed. Prior to the procedure, the treatment site was clearly identified and confirmed by the patient. All components of Universal Protocol/PAUSE Rule completed.
Consent (Lip)/Introductory Paragraph: The rationale for Mohs was explained to the patient and consent was obtained. The risks, benefits and alternatives to therapy were discussed in detail. Specifically, the risks of lip deformity, changes in the oral aperture, infection, scarring, bleeding, prolonged wound healing, incomplete removal, allergy to anesthesia, nerve injury and recurrence were addressed. Prior to the procedure, the treatment site was clearly identified and confirmed by the patient. All components of Universal Protocol/PAUSE Rule completed.
Consent (Scalp)/Introductory Paragraph: The rationale for Mohs was explained to the patient and consent was obtained. The risks, benefits and alternatives to therapy were discussed in detail. Specifically, the risks of changes in hair growth pattern secondary to repair, infection, scarring, bleeding, prolonged wound healing, incomplete removal, allergy to anesthesia, nerve injury and recurrence were addressed. Prior to the procedure, the treatment site was clearly identified and confirmed by the patient. All components of Universal Protocol/PAUSE Rule completed.
Detail Level: Detailed
Postop Diagnosis: same
Anesthesia Type: 2% lidocaine with epinephrine and a 1:10 solution of 8.4% sodium bicarbonate
Anesthesia Volume In Cc: 3
Hemostasis: Electrocautery
Estimated Blood Loss (Cc): minimal
Repair Anesthesia Method: local infiltration
Brow Lift Text: A midfrontal incision was made medially to the defect to allow access to the tissues just superior to the left eyebrow. Following careful dissection inferiorly in a supraperiosteal plane to the level of the left eyebrow, several 3-0 monocryl sutures were used to resuspend the eyebrow orbicularis oculi muscular unit to the superior frontal bone periosteum. This resulted in an appropriate reapproximation of static eyebrow symmetry and correction of the left brow ptosis.
Deep Sutures: 5-0 Monocryl
Epidermal Sutures: 6-0 Ethilon
Epidermal Closure: running
Suturegard Intro: Intraoperative tissue expansion was performed, utilizing the SUTUREGARD device, in order to reduce wound tension.
Suturegard Body: The suture ends were repeatedly re-tightened and re-clamped to achieve the desired tissue expansion.
Hemigard Intro: Due to skin fragility and wound tension, it was decided to use HEMIGARD adhesive retention suture devices to permit a linear closure. The skin was cleaned and dried for a 6cm distance away from the wound. Excessive hair, if present, was removed to allow for adhesion.
Hemigard Postcare Instructions: The HEMIGARD strips are to remain completely dry for at least 5-7 days.
Donor Site Anesthesia Type: same as repair anesthesia
Epidermal Closure Graft Donor Site (Optional): simple interrupted
Graft Donor Site Bandage (Optional-Leave Blank If You Don't Want In Note): Steri-strips and a pressure bandage were applied to the donor site.
Closure 2 Information: This tab is for additional flaps and grafts, including complex repair and grafts and complex repair and flaps. You can also specify a different location for the additional defect, if the location is the same you do not need to select a new one. We will insert the automated text for the repair you select below just as we do for solitary flaps and grafts. Please note that at this time if you select a location with a different insurance zone you will need to override the ICD10 and CPT if appropriate.
Closure 3 Information: This tab is for additional flaps and grafts above and beyond our usual structured repairs.  Please note if you enter information here it will not currently bill and you will need to add the billing information manually.
Wound Care: Polysporin ointment
Dressing: pressure dressing
Dressing (No Sutures): dry sterile dressing
Suture Removal: 7 days
Unna Boot Text: An Unna boot was placed to help immobilize the limb and facilitate more rapid healing.
Home Suture Removal Text: Patient was provided instructions on removing sutures and will remove their sutures at home.  If they have any questions or difficulties they will call the office.
Post-Care Instructions: I reviewed with the patient in detail post-care instructions. Patient is not to engage in any heavy lifting, exercise, or swimming for the next 14 days. Should the patient develop any fevers, chills, bleeding, severe pain patient will contact the office immediately.
Pain Refusal Text: I offered to prescribe pain medication but the patient refused to take this medication. Patient currently taking Gabapentin 100mg as needed.
Mauc Instructions: By selecting yes to the question below the MAUC number will be added into the note.  This will be calculated automatically based on the diagnosis chosen, the size entered, the body zone selected (H,M,L) and the specific indications you chose. You will also have the option to override the Mohs AUC if you disagree with the automatically calculated number and this option is found in the Case Summary tab.
Where Do You Want The Question To Include Opioid Counseling Located?: Case Summary Tab
Eye Protection Verbiage: Before proceeding with the stage, a plastic scleral shield was inserted. The globe was anesthetized with a few drops of 1% lidocaine with 1:100,000 epinephrine. Then, an appropriate sized scleral shield was chosen and coated with lacrilube ointment. The shield was gently inserted and left in place for the duration of each stage. After the stage was completed, the shield was gently removed.
Mohs Method Verbiage: An incision at a 45 degree angle following the standard Mohs approach was done and the specimen was harvested as a microscopic controlled layer.
Surgeon/Pathologist Verbiage (Will Incorporate Name Of Surgeon From Intro If Not Blank): operated in two distinct and integrated capacities as the surgeon and pathologist.
Mohs Histo Method Verbiage: Each section was then chromacoded and processed in the Mohs lab using the Mohs protocol and submitted for frozen section.
Subsequent Stages Histo Method Verbiage: Using a similar technique to that described above, a thin layer of tissue was removed from all areas where tumor was visible on the previous stage.  The tissue was again oriented, mapped, dyed, and processed as above.
Mohs Rapid Report Verbiage: The area of clinically evident tumor was marked with skin marking ink and appropriately hatched.  The initial incision was made following the Mohs approach through the skin.  The specimen was taken to the lab, divided into the necessary number of pieces, chromacoded and processed according to the Mohs protocol.  This was repeated in successive stages until a tumor free defect was achieved.
Complex Repair Preamble Text (Leave Blank If You Do Not Want): Extensive wide and deep undermining was performed.
Crescentic Complex Repair Preamble Text (Leave Blank If You Do Not Want): Extensive wide undermining was performed.
Intermediate Repair Preamble Text (Leave Blank If You Do Not Want): Minimal undermining was performed with blunt dissection.
Crescentic Intermediate Repair Preamble Text (Leave Blank If You Do Not Want): Undermining was performed with blunt dissection.
Graft Cartilage Fenestration Text: The cartilage was fenestrated with a 3mm punch biopsy to help facilitate graft survival and healing.
Non-Graft Cartilage Fenestration Text: The cartilage was fenestrated with a 2mm punch biopsy to help facilitate healing.
Secondary Intention Text (Leave Blank If You Do Not Want): The defect will heal with secondary intention.
No Repair - Repaired With Adjacent Surgical Defect Text (Leave Blank If You Do Not Want): After obtaining clear surgical margins the defect was repaired concurrently with another surgical defect which was in close approximation.
Adjacent Tissue Transfer Text: The defect edges were debeveled with a #15 scalpel blade.  Given the location of the defect and the proximity to free margins an adjacent tissue transfer was deemed most appropriate.  Using a sterile surgical marker, an appropriate flap was drawn incorporating the defect and placing the expected incisions within the relaxed skin tension lines where possible.    The area thus outlined was incised deep to adipose tissue with a #15 scalpel blade.  The skin margins were undermined to an appropriate distance in all directions utilizing iris scissors.
Advancement Flap (Single) Text: The defect edges were debeveled with a #15 scalpel blade.  Given the location of the defect and the proximity to free margins a single advancement flap was deemed most appropriate.  Using a sterile surgical marker, an appropriate advancement flap was drawn incorporating the defect and placing the expected incisions within the relaxed skin tension lines where possible.    The area thus outlined was incised deep to adipose tissue with a #15 scalpel blade.  The skin margins were undermined to an appropriate distance in all directions utilizing iris scissors.
Advancement Flap (Double) Text: The defect edges were debeveled with a #15 scalpel blade.  Given the location of the defect and the proximity to free margins a double advancement flap was deemed most appropriate.  Using a sterile surgical marker, the appropriate advancement flaps were drawn incorporating the defect and placing the expected incisions within the relaxed skin tension lines where possible.    The area thus outlined was incised deep to adipose tissue with a #15 scalpel blade.  The skin margins were undermined to an appropriate distance in all directions utilizing iris scissors.
Advancement-Rotation Flap Text: The defect edges were debeveled with a #15 scalpel blade.  Given the location of the defect, shape of the defect and the proximity to free margins an advancement-rotation flap was deemed most appropriate.  Using a sterile surgical marker, an appropriate flap was drawn incorporating the defect and placing the expected incisions within the relaxed skin tension lines where possible. The area thus outlined was incised deep to adipose tissue with a #15 scalpel blade.  The skin margins were undermined to an appropriate distance in all directions utilizing iris scissors.
Alar Island Pedicle Flap Text: The defect edges were debeveled with a #15 scalpel blade.  Given the location of the defect, shape of the defect and the proximity to the alar rim an island pedicle advancement flap was deemed most appropriate.  Using a sterile surgical marker, an appropriate advancement flap was drawn incorporating the defect, outlining the appropriate donor tissue and placing the expected incisions within the nasal ala running parallel to the alar rim. The area thus outlined was incised with a #15 scalpel blade.  The skin margins were undermined minimally to an appropriate distance in all directions around the primary defect and laterally outward around the island pedicle utilizing iris scissors.  There was minimal undermining beneath the pedicle flap.
A-T Advancement Flap Text: The defect edges were debeveled with a #15 scalpel blade.  Given the location of the defect, shape of the defect and the proximity to free margins an A-T advancement flap was deemed most appropriate.  Using a sterile surgical marker, an appropriate advancement flap was drawn incorporating the defect and placing the expected incisions within the relaxed skin tension lines where possible.    The area thus outlined was incised deep to adipose tissue with a #15 scalpel blade.  The skin margins were undermined to an appropriate distance in all directions utilizing iris scissors.
Banner Transposition Flap Text: The defect edges were debeveled with a #15 scalpel blade.  Given the location of the defect and the proximity to free margins a Banner transposition flap was deemed most appropriate.  Using a sterile surgical marker, an appropriate flap drawn around the defect. The area thus outlined was incised deep to adipose tissue with a #15 scalpel blade.  The skin margins were undermined to an appropriate distance in all directions utilizing iris scissors.
Bilateral Helical Rim Advancement Flap Text: The defect edges were debeveled with a #15 blade scalpel.  Given the location of the defect and the proximity to free margins (helical rim) a bilateral helical rim advancement flap was deemed most appropriate.  Using a sterile surgical marker, the appropriate advancement flaps were drawn incorporating the defect and placing the expected incisions between the helical rim and antihelix where possible.  The area thus outlined was incised through and through with a #15 scalpel blade.  With a skin hook and iris scissors, the flaps were gently and sharply undermined and freed up.
Bilateral Rotation Flap Text: The defect edges were debeveled with a #15 scalpel blade. Given the location of the defect, shape of the defect and the proximity to free margins a bilateral rotation flap was deemed most appropriate. Using a sterile surgical marker, an appropriate rotation flap was drawn incorporating the defect and placing the expected incisions within the relaxed skin tension lines where possible. The area thus outlined was incised deep to adipose tissue with a #15 scalpel blade. The skin margins were undermined to an appropriate distance in all directions utilizing iris scissors. Following this, the designed flap was carried over into the primary defect and sutured into place.
Bilobed Flap Text: The defect edges were debeveled with a #15 scalpel blade.  Given the location of the defect and the proximity to free margins a bilobe flap was deemed most appropriate.  Using a sterile surgical marker, an appropriate bilobe flap drawn around the defect.    The area thus outlined was incised deep to adipose tissue with a #15 scalpel blade.  The skin margins were undermined to an appropriate distance in all directions utilizing iris scissors.
Bilobed Transposition Flap Text: The defect edges were debeveled with a #15 scalpel blade.  Given the location of the defect and the proximity to free margins a bilobed transposition flap was deemed most appropriate.  Using a sterile surgical marker, an appropriate bilobe flap drawn around the defect.    The area thus outlined was incised deep to adipose tissue with a #15 scalpel blade.  The skin margins were undermined to an appropriate distance in all directions utilizing iris scissors.
Bi-Rhombic Flap Text: The defect edges were debeveled with a #15 scalpel blade.  Given the location of the defect and the proximity to free margins a bi-rhombic flap was deemed most appropriate.  Using a sterile surgical marker, an appropriate rhombic flap was drawn incorporating the defect. The area thus outlined was incised deep to adipose tissue with a #15 scalpel blade.  The skin margins were undermined to an appropriate distance in all directions utilizing iris scissors.
Burow's Advancement Flap Text: The defect edges were debeveled with a #15 scalpel blade.  Given the location of the defect and the proximity to free margins a Burow's advancement flap was deemed most appropriate.  Using a sterile surgical marker, the appropriate advancement flap was drawn incorporating the defect and placing the expected incisions within the relaxed skin tension lines where possible.    The area thus outlined was incised deep to adipose tissue with a #15 scalpel blade.  The skin margins were undermined to an appropriate distance in all directions utilizing iris scissors.
Chonodrocutaneous Helical Advancement Flap Text: The defect edges were debeveled with a #15 scalpel blade.  Given the location of the defect and the proximity to free margins a chondrocutaneous helical advancement flap was deemed most appropriate.  Using a sterile surgical marker, the appropriate advancement flap was drawn incorporating the defect and placing the expected incisions within the relaxed skin tension lines where possible.    The area thus outlined was incised deep to adipose tissue with a #15 scalpel blade.  The skin margins were undermined to an appropriate distance in all directions utilizing iris scissors.
Crescentic Advancement Flap Text: The defect edges were debeveled with a #15 scalpel blade.  Given the location of the defect and the proximity to free margins a crescentic advancement flap was deemed most appropriate.  Using a sterile surgical marker, the appropriate advancement flap was drawn incorporating the defect and placing the expected incisions within the relaxed skin tension lines where possible.    The area thus outlined was incised deep to adipose tissue with a #15 scalpel blade.  The skin margins were undermined to an appropriate distance in all directions utilizing iris scissors.
Dorsal Nasal Flap Text: The defect edges were debeveled with a #15 scalpel blade.  Given the location of the defect and the proximity to free margins a dorsal nasal flap was deemed most appropriate.  Using a sterile surgical marker, an appropriate dorsal nasal flap was drawn around the defect.    The area thus outlined was incised deep to adipose tissue with a #15 scalpel blade.  The skin margins were undermined to an appropriate distance in all directions utilizing iris scissors.
Double Island Pedicle Flap Text: The defect edges were debeveled with a #15 scalpel blade.  Given the location of the defect, shape of the defect and the proximity to free margins a double island pedicle advancement flap was deemed most appropriate.  Using a sterile surgical marker, an appropriate advancement flap was drawn incorporating the defect, outlining the appropriate donor tissue and placing the expected incisions within the relaxed skin tension lines where possible.    The area thus outlined was incised deep to adipose tissue with a #15 scalpel blade.  The skin margins were undermined to an appropriate distance in all directions around the primary defect and laterally outward around the island pedicle utilizing iris scissors.  There was minimal undermining beneath the pedicle flap.
Double O-Z Flap Text: The defect edges were debeveled with a #15 scalpel blade.  Given the location of the defect, shape of the defect and the proximity to free margins a Double O-Z flap was deemed most appropriate.  Using a sterile surgical marker, an appropriate transposition flap was drawn incorporating the defect and placing the expected incisions within the relaxed skin tension lines where possible. The area thus outlined was incised deep to adipose tissue with a #15 scalpel blade.  The skin margins were undermined to an appropriate distance in all directions utilizing iris scissors.
Double O-Z Plasty Text: The defect edges were debeveled with a #15 scalpel blade.  Given the location of the defect, shape of the defect and the proximity to free margins a Double O-Z plasty (double transposition flap) was deemed most appropriate.  Using a sterile surgical marker, the appropriate transposition flaps were drawn incorporating the defect and placing the expected incisions within the relaxed skin tension lines where possible. The area thus outlined was incised deep to adipose tissue with a #15 scalpel blade.  The skin margins were undermined to an appropriate distance in all directions utilizing iris scissors.  Hemostasis was achieved with electrocautery.  The flaps were then transposed into place, one clockwise and the other counterclockwise, and anchored with interrupted buried subcutaneous sutures.
Double Z Plasty Text: The lesion was extirpated to the level of the fat with a #15 scalpel blade. Given the location of the defect, shape of the defect and the proximity to free margins a double Z-plasty was deemed most appropriate for repair. Using a sterile surgical marker, the appropriate transposition arms of the double Z-plasty were drawn incorporating the defect and placing the expected incisions within the relaxed skin tension lines where possible. The area thus outlined was incised deep to adipose tissue with a #15 scalpel blade. The skin margins were undermined to an appropriate distance in all directions utilizing iris scissors. The opposing transposition arms were then transposed and carried over into place in opposite direction and anchored with interrupted buried subcutaneous sutures.
Ear Star Wedge Flap Text: The defect edges were debeveled with a #15 blade scalpel.  Given the location of the defect and the proximity to free margins (helical rim) an ear star wedge flap was deemed most appropriate.  Using a sterile surgical marker, the appropriate flap was drawn incorporating the defect and placing the expected incisions between the helical rim and antihelix where possible.  The area thus outlined was incised through and through with a #15 scalpel blade.
Flip-Flop Flap Text: The defect edges were debeveled with a #15 blade scalpel.  Given the location of the defect and the proximity to free margins a flip-flop flap was deemed most appropriate. Using a sterile surgical marker, the appropriate flap was drawn incorporating the defect and placing the expected incisions between the helical rim and antihelix where possible.  The area thus outlined was incised through and through with a #15 scalpel blade. Following this, the designed flap was carried over into the primary defect and sutured into place.
Hatchet Flap Text: The defect edges were debeveled with a #15 scalpel blade.  Given the location of the defect, shape of the defect and the proximity to free margins a hatchet flap was deemed most appropriate.  Using a sterile surgical marker, an appropriate hatchet flap was drawn incorporating the defect and placing the expected incisions within the relaxed skin tension lines where possible.    The area thus outlined was incised deep to adipose tissue with a #15 scalpel blade.  The skin margins were undermined to an appropriate distance in all directions utilizing iris scissors.
Helical Rim Advancement Flap Text: The defect edges were debeveled with a #15 blade scalpel.  Given the location of the defect and the proximity to free margins (helical rim) a double helical rim advancement flap was deemed most appropriate.  Using a sterile surgical marker, the appropriate advancement flaps were drawn incorporating the defect and placing the expected incisions between the helical rim and antihelix where possible.  The area thus outlined was incised through and through with a #15 scalpel blade.  With a skin hook and iris scissors, the flaps were gently and sharply undermined and freed up.
H Plasty Text: Given the location of the defect, shape of the defect and the proximity to free margins a H-plasty was deemed most appropriate for repair.  Using a sterile surgical marker, the appropriate advancement arms of the H-plasty were drawn incorporating the defect and placing the expected incisions within the relaxed skin tension lines where possible. The area thus outlined was incised deep to adipose tissue with a #15 scalpel blade. The skin margins were undermined to an appropriate distance in all directions utilizing iris scissors.  The opposing advancement arms were then advanced into place in opposite direction and anchored with interrupted buried subcutaneous sutures.
Island Pedicle Flap Text: The defect edges were debeveled with a #15 scalpel blade.  Given the location of the defect, shape of the defect and the proximity to free margins an island pedicle advancement flap was deemed most appropriate.  Using a sterile surgical marker, an appropriate advancement flap was drawn incorporating the defect, outlining the appropriate donor tissue and placing the expected incisions within the relaxed skin tension lines where possible.    The area thus outlined was incised deep to adipose tissue with a #15 scalpel blade.  The skin margins were undermined to an appropriate distance in all directions around the primary defect and laterally outward around the island pedicle utilizing iris scissors.  There was minimal undermining beneath the pedicle flap.
Island Pedicle Flap With Canthal Suspension Text: The defect edges were debeveled with a #15 scalpel blade.  Given the location of the defect, shape of the defect and the proximity to free margins an island pedicle advancement flap was deemed most appropriate.  Using a sterile surgical marker, an appropriate advancement flap was drawn incorporating the defect, outlining the appropriate donor tissue and placing the expected incisions within the relaxed skin tension lines where possible. The area thus outlined was incised deep to adipose tissue with a #15 scalpel blade.  The skin margins were undermined to an appropriate distance in all directions around the primary defect and laterally outward around the island pedicle utilizing iris scissors.  There was minimal undermining beneath the pedicle flap. A suspension suture was placed in the canthal tendon to prevent tension and prevent ectropion.
Island Pedicle Flap-Requiring Vessel Identification Text: The defect edges were debeveled with a #15 scalpel blade.  Given the location of the defect, shape of the defect and the proximity to free margins an island pedicle advancement flap was deemed most appropriate.  Using a sterile surgical marker, an appropriate advancement flap was drawn, based on the axial vessel mentioned above, incorporating the defect, outlining the appropriate donor tissue and placing the expected incisions within the relaxed skin tension lines where possible.    The area thus outlined was incised deep to adipose tissue with a #15 scalpel blade.  The skin margins were undermined to an appropriate distance in all directions around the primary defect and laterally outward around the island pedicle utilizing iris scissors.  There was minimal undermining beneath the pedicle flap.
Keystone Flap Text: The defect edges were debeveled with a #15 scalpel blade.  Given the location of the defect, shape of the defect a keystone flap was deemed most appropriate.  Using a sterile surgical marker, an appropriate keystone flap was drawn incorporating the defect, outlining the appropriate donor tissue and placing the expected incisions within the relaxed skin tension lines where possible. The area thus outlined was incised deep to adipose tissue with a #15 scalpel blade.  The skin margins were undermined to an appropriate distance in all directions around the primary defect and laterally outward around the flap utilizing iris scissors.
Melolabial Transposition Flap Text: The defect edges were debeveled with a #15 scalpel blade.  Given the location of the defect and the proximity to free margins a melolabial flap was deemed most appropriate.  Using a sterile surgical marker, an appropriate melolabial transposition flap was drawn incorporating the defect.    The area thus outlined was incised deep to adipose tissue with a #15 scalpel blade.  The skin margins were undermined to an appropriate distance in all directions utilizing iris scissors.
Mercedes Flap Text: The defect edges were debeveled with a #15 scalpel blade.  Given the location of the defect, shape of the defect and the proximity to free margins a Mercedes flap was deemed most appropriate.  Using a sterile surgical marker, an appropriate advancement flap was drawn incorporating the defect and placing the expected incisions within the relaxed skin tension lines where possible. The area thus outlined was incised deep to adipose tissue with a #15 scalpel blade.  The skin margins were undermined to an appropriate distance in all directions utilizing iris scissors.
Modified Advancement Flap Text: The defect edges were debeveled with a #15 scalpel blade.  Given the location of the defect, shape of the defect and the proximity to free margins a modified advancement flap was deemed most appropriate.  Using a sterile surgical marker, an appropriate advancement flap was drawn incorporating the defect and placing the expected incisions within the relaxed skin tension lines where possible.    The area thus outlined was incised deep to adipose tissue with a #15 scalpel blade.  The skin margins were undermined to an appropriate distance in all directions utilizing iris scissors.
Mucosal Advancement Flap Text: Given the location of the defect, shape of the defect and the proximity to free margins a mucosal advancement flap was deemed most appropriate. Incisions were made with a 15 blade scalpel in the appropriate fashion along the cutaneous vermilion border and the mucosal lip. The remaining actinically damaged mucosal tissue was excised.  The mucosal advancement flap was then elevated to the gingival sulcus with care taken to preserve the neurovascular structures and advanced into the primary defect. Care was taken to ensure that precise realignment of the vermilion border was achieved.
Muscle Hinge Flap Text: The defect edges were debeveled with a #15 scalpel blade.  Given the size, depth and location of the defect and the proximity to free margins a muscle hinge flap was deemed most appropriate.  Using a sterile surgical marker, an appropriate hinge flap was drawn incorporating the defect. The area thus outlined was incised with a #15 scalpel blade.  The skin margins were undermined to an appropriate distance in all directions utilizing iris scissors.
Mustarde Flap Text: The defect edges were debeveled with a #15 scalpel blade.  Given the size, depth and location of the defect and the proximity to free margins a Mustarde flap was deemed most appropriate.  Using a sterile surgical marker, an appropriate flap was drawn incorporating the defect. The area thus outlined was incised with a #15 scalpel blade.  The skin margins were undermined to an appropriate distance in all directions utilizing iris scissors.
Nasal Turnover Hinge Flap Text: The defect edges were debeveled with a #15 scalpel blade.  Given the size, depth, location of the defect and the defect being full thickness a nasal turnover hinge flap was deemed most appropriate.  Using a sterile surgical marker, an appropriate hinge flap was drawn incorporating the defect. The area thus outlined was incised with a #15 scalpel blade. The flap was designed to recreate the nasal mucosal lining and the alar rim. The skin margins were undermined to an appropriate distance in all directions utilizing iris scissors.
Nasalis-Muscle-Based Myocutaneous Island Pedicle Flap Text: Using a #15 blade, an incision was made around the donor flap to the level of the nasalis muscle. Wide lateral undermining was then performed in both the subcutaneous plane above the nasalis muscle, and in a submuscular plane just above periosteum. This allowed the formation of a free nasalis muscle axial pedicle (based on the angular artery) which was still attached to the actual cutaneous flap, increasing its mobility and vascular viability. Hemostasis was obtained with pinpoint electrocoagulation. The flap was mobilized into position and the pivotal anchor points positioned and stabilized with buried interrupted sutures. Subcutaneous and dermal tissues were closed in a multilayered fashion with sutures. Tissue redundancies were excised, and the epidermal edges were apposed without significant tension and sutured with sutures.
Nasalis Myocutaneous Flap Text: Using a #15 blade, an incision was made around the donor flap to the level of the nasalis muscle. Wide lateral undermining was then performed in both the subcutaneous plane above the nasalis muscle, and in a submuscular plane just above periosteum. This allowed the formation of a free nasalis muscle axial pedicle which was still attached to the actual cutaneous flap, increasing its mobility and vascular viability. Hemostasis was obtained with pinpoint electrocoagulation. The flap was mobilized into position and the pivotal anchor points positioned and stabilized with buried interrupted sutures. Subcutaneous and dermal tissues were closed in a multilayered fashion with sutures. Tissue redundancies were excised, and the epidermal edges were apposed without significant tension and sutured with sutures.
Nasolabial Transposition Flap Text: The defect edges were debeveled with a #15 scalpel blade.  Given the size, depth and location of the defect and the proximity to free margins a nasolabial transposition flap was deemed most appropriate. Using a sterile surgical marker, an appropriate flap was drawn incorporating the defect. The area thus outlined was incised with a #15 scalpel blade. The skin margins were undermined to an appropriate distance in all directions utilizing iris scissors. Following this, the designed flap was carried into the primary defect and sutured into place.
Orbicularis Oris Muscle Flap Text: The defect edges were debeveled with a #15 scalpel blade.  Given that the defect affected the competency of the oral sphincter an obicularis oris muscle flap was deemed most appropriate to restore this competency and normal muscle function.  Using a sterile surgical marker, an appropriate flap was drawn incorporating the defect. The area thus outlined was incised with a #15 scalpel blade.
O-T Advancement Flap Text: The defect edges were debeveled with a #15 scalpel blade.  Given the location of the defect, shape of the defect and the proximity to free margins an O-T advancement flap was deemed most appropriate.  Using a sterile surgical marker, an appropriate advancement flap was drawn incorporating the defect and placing the expected incisions within the relaxed skin tension lines where possible.    The area thus outlined was incised deep to adipose tissue with a #15 scalpel blade.  The skin margins were undermined to an appropriate distance in all directions utilizing iris scissors.
O-T Plasty Text: The defect edges were debeveled with a #15 scalpel blade.  Given the location of the defect, shape of the defect and the proximity to free margins an O-T plasty was deemed most appropriate.  Using a sterile surgical marker, an appropriate O-T plasty was drawn incorporating the defect and placing the expected incisions within the relaxed skin tension lines where possible.    The area thus outlined was incised deep to adipose tissue with a #15 scalpel blade.  The skin margins were undermined to an appropriate distance in all directions utilizing iris scissors.
O-L Flap Text: The defect edges were debeveled with a #15 scalpel blade.  Given the location of the defect, shape of the defect and the proximity to free margins an O-L flap was deemed most appropriate.  Using a sterile surgical marker, an appropriate advancement flap was drawn incorporating the defect and placing the expected incisions within the relaxed skin tension lines where possible.    The area thus outlined was incised deep to adipose tissue with a #15 scalpel blade.  The skin margins were undermined to an appropriate distance in all directions utilizing iris scissors.
O-Z Flap Text: The defect edges were debeveled with a #15 scalpel blade.  Given the location of the defect, shape of the defect and the proximity to free margins an O-Z flap was deemed most appropriate.  Using a sterile surgical marker, an appropriate transposition flap was drawn incorporating the defect and placing the expected incisions within the relaxed skin tension lines where possible. The area thus outlined was incised deep to adipose tissue with a #15 scalpel blade.  The skin margins were undermined to an appropriate distance in all directions utilizing iris scissors.
O-Z Plasty Text: The defect edges were debeveled with a #15 scalpel blade.  Given the location of the defect, shape of the defect and the proximity to free margins an O-Z plasty (double transposition flap) was deemed most appropriate.  Using a sterile surgical marker, the appropriate transposition flaps were drawn incorporating the defect and placing the expected incisions within the relaxed skin tension lines where possible.    The area thus outlined was incised deep to adipose tissue with a #15 scalpel blade.  The skin margins were undermined to an appropriate distance in all directions utilizing iris scissors.  Hemostasis was achieved with electrocautery.  The flaps were then transposed into place, one clockwise and the other counterclockwise, and anchored with interrupted buried subcutaneous sutures.
Peng Advancement Flap Text: The defect edges were debeveled with a #15 scalpel blade.  Given the location of the defect, shape of the defect and the proximity to free margins a Peng advancement flap was deemed most appropriate.  Using a sterile surgical marker, an appropriate advancement flap was drawn incorporating the defect and placing the expected incisions within the relaxed skin tension lines where possible. The area thus outlined was incised deep to adipose tissue with a #15 scalpel blade.  The skin margins were undermined to an appropriate distance in all directions utilizing iris scissors.
Rectangular Flap Text: The defect edges were debeveled with a #15 scalpel blade. Given the location of the defect and the proximity to free margins a rectangular flap was deemed most appropriate. Using a sterile surgical marker, an appropriate rectangular flap was drawn incorporating the defect. The area thus outlined was incised deep to adipose tissue with a #15 scalpel blade. The skin margins were undermined to an appropriate distance in all directions utilizing iris scissors. Following this, the designed flap was carried over into the primary defect and sutured into place.
Rhombic Flap Text: The defect edges were debeveled with a #15 scalpel blade.  Given the location of the defect and the proximity to free margins a rhombic flap was deemed most appropriate.  Using a sterile surgical marker, an appropriate rhombic flap was drawn incorporating the defect.    The area thus outlined was incised deep to adipose tissue with a #15 scalpel blade.  The skin margins were undermined to an appropriate distance in all directions utilizing iris scissors.
Rhomboid Transposition Flap Text: The defect edges were debeveled with a #15 scalpel blade.  Given the location of the defect and the proximity to free margins a rhomboid transposition flap was deemed most appropriate.  Using a sterile surgical marker, an appropriate rhomboid flap was drawn incorporating the defect.    The area thus outlined was incised deep to adipose tissue with a #15 scalpel blade.  The skin margins were undermined to an appropriate distance in all directions utilizing iris scissors.
Rotation Flap Text: The defect edges were debeveled with a #15 scalpel blade.  Given the location of the defect, shape of the defect and the proximity to free margins a rotation flap was deemed most appropriate.  Using a sterile surgical marker, an appropriate rotation flap was drawn incorporating the defect and placing the expected incisions within the relaxed skin tension lines where possible.    The area thus outlined was incised deep to adipose tissue with a #15 scalpel blade.  The skin margins were undermined to an appropriate distance in all directions utilizing iris scissors.
Spiral Flap Text: The defect edges were debeveled with a #15 scalpel blade.  Given the location of the defect, shape of the defect and the proximity to free margins a spiral flap was deemed most appropriate.  Using a sterile surgical marker, an appropriate rotation flap was drawn incorporating the defect and placing the expected incisions within the relaxed skin tension lines where possible. The area thus outlined was incised deep to adipose tissue with a #15 scalpel blade.  The skin margins were undermined to an appropriate distance in all directions utilizing iris scissors.
Staged Advancement Flap Text: The defect edges were debeveled with a #15 scalpel blade.  Given the location of the defect, shape of the defect and the proximity to free margins a staged advancement flap was deemed most appropriate.  Using a sterile surgical marker, an appropriate advancement flap was drawn incorporating the defect and placing the expected incisions within the relaxed skin tension lines where possible. The area thus outlined was incised deep to adipose tissue with a #15 scalpel blade.  The skin margins were undermined to an appropriate distance in all directions utilizing iris scissors.
Star Wedge Flap Text: The defect edges were debeveled with a #15 scalpel blade.  Given the location of the defect, shape of the defect and the proximity to free margins a star wedge flap was deemed most appropriate.  Using a sterile surgical marker, an appropriate rotation flap was drawn incorporating the defect and placing the expected incisions within the relaxed skin tension lines where possible. The area thus outlined was incised deep to adipose tissue with a #15 scalpel blade.  The skin margins were undermined to an appropriate distance in all directions utilizing iris scissors.
Transposition Flap Text: The defect edges were debeveled with a #15 scalpel blade.  Given the location of the defect and the proximity to free margins a transposition flap was deemed most appropriate.  Using a sterile surgical marker, an appropriate transposition flap was drawn incorporating the defect.    The area thus outlined was incised deep to adipose tissue with a #15 scalpel blade.  The skin margins were undermined to an appropriate distance in all directions utilizing iris scissors.
Trilobed Flap Text: The defect edges were debeveled with a #15 scalpel blade.  Given the location of the defect and the proximity to free margins a trilobed flap was deemed most appropriate.  Using a sterile surgical marker, an appropriate trilobed flap drawn around the defect.    The area thus outlined was incised deep to adipose tissue with a #15 scalpel blade.  The skin margins were undermined to an appropriate distance in all directions utilizing iris scissors.
V-Y Flap Text: The defect edges were debeveled with a #15 scalpel blade.  Given the location of the defect, shape of the defect and the proximity to free margins a V-Y flap was deemed most appropriate.  Using a sterile surgical marker, an appropriate advancement flap was drawn incorporating the defect and placing the expected incisions within the relaxed skin tension lines where possible.    The area thus outlined was incised deep to adipose tissue with a #15 scalpel blade.  The skin margins were undermined to an appropriate distance in all directions utilizing iris scissors.
V-Y Plasty Text: The defect edges were debeveled with a #15 scalpel blade.  Given the location of the defect, shape of the defect and the proximity to free margins an V-Y advancement flap was deemed most appropriate.  Using a sterile surgical marker, an appropriate advancement flap was drawn incorporating the defect and placing the expected incisions within the relaxed skin tension lines where possible.    The area thus outlined was incised deep to adipose tissue with a #15 scalpel blade.  The skin margins were undermined to an appropriate distance in all directions utilizing iris scissors.
W Plasty Text: The lesion was extirpated to the level of the fat with a #15 scalpel blade.  Given the location of the defect, shape of the defect and the proximity to free margins a W-plasty was deemed most appropriate for repair.  Using a sterile surgical marker, the appropriate transposition arms of the W-plasty were drawn incorporating the defect and placing the expected incisions within the relaxed skin tension lines where possible.    The area thus outlined was incised deep to adipose tissue with a #15 scalpel blade.  The skin margins were undermined to an appropriate distance in all directions utilizing iris scissors.  The opposing transposition arms were then transposed into place in opposite direction and anchored with interrupted buried subcutaneous sutures.
Z Plasty Text: The lesion was extirpated to the level of the fat with a #15 scalpel blade.  Given the location of the defect, shape of the defect and the proximity to free margins a Z-plasty was deemed most appropriate for repair.  Using a sterile surgical marker, the appropriate transposition arms of the Z-plasty were drawn incorporating the defect and placing the expected incisions within the relaxed skin tension lines where possible.    The area thus outlined was incised deep to adipose tissue with a #15 scalpel blade.  The skin margins were undermined to an appropriate distance in all directions utilizing iris scissors.  The opposing transposition arms were then transposed into place in opposite direction and anchored with interrupted buried subcutaneous sutures.
Zygomaticofacial Flap Text: Given the location of the defect, shape of the defect and the proximity to free margins a zygomaticofacial flap was deemed most appropriate for repair.  Using a sterile surgical marker, the appropriate flap was drawn incorporating the defect and placing the expected incisions within the relaxed skin tension lines where possible. The area thus outlined was incised deep to adipose tissue with a #15 scalpel blade with preservation of a vascular pedicle.  The skin margins were undermined to an appropriate distance in all directions utilizing iris scissors.  The flap was then placed into the defect and anchored with interrupted buried subcutaneous sutures.
Abbe Flap (Lower To Upper Lip) Text: The defect of the upper lip was assessed and measured.  Given the location and size of the defect, an Abbe flap was deemed most appropriate. Using a sterile surgical marker, an appropriate Abbe flap was measured and drawn on the lower lip. Local anesthesia was then infiltrated. A scalpel was then used to incise the upper lip through and through the skin, vermilion, muscle and mucosa, leaving the flap pedicled on the opposite side.  The flap was then rotated and transferred to the lower lip defect.  The flap was then sutured into place with a three layer technique, closing the orbicularis oris muscle layer with subcutaneous buried sutures, followed by a mucosal layer and an epidermal layer.
Abbe Flap (Upper To Lower Lip) Text: The defect of the lower lip was assessed and measured.  Given the location and size of the defect, an Abbe flap was deemed most appropriate. Using a sterile surgical marker, an appropriate Abbe flap was measured and drawn on the upper lip. Local anesthesia was then infiltrated.  A scalpel was then used to incise the upper lip through and through the skin, vermilion, muscle and mucosa, leaving the flap pedicled on the opposite side.  The flap was then rotated and transferred to the lower lip defect.  The flap was then sutured into place with a three layer technique, closing the orbicularis oris muscle layer with subcutaneous buried sutures, followed by a mucosal layer and an epidermal layer.
Cheek Interpolation Flap Text: A decision was made to reconstruct the defect utilizing an interpolation axial flap and a staged reconstruction.  A telfa template was made of the defect.  This telfa template was then used to outline the Cheek Interpolation flap.  The donor area for the pedicle flap was then injected with anesthesia.  The flap was excised through the skin and subcutaneous tissue down to the layer of the underlying musculature.  The interpolation flap was carefully excised within this deep plane to maintain its blood supply.  The edges of the donor site were undermined.   The donor site was closed in a primary fashion.  The pedicle was then rotated into position and sutured.  Once the tube was sutured into place, adequate blood supply was confirmed with blanching and refill.  The pedicle was then wrapped with xeroform gauze and dressed appropriately with a telfa and gauze bandage to ensure continued blood supply and protect the attached pedicle.
Cheek-To-Nose Interpolation Flap Text: A decision was made to reconstruct the defect utilizing an interpolation axial flap and a staged reconstruction.  A telfa template was made of the defect.  This telfa template was then used to outline the Cheek-To-Nose Interpolation flap.  The donor area for the pedicle flap was then injected with anesthesia.  The flap was excised through the skin and subcutaneous tissue down to the layer of the underlying musculature.  The interpolation flap was carefully excised within this deep plane to maintain its blood supply.  The edges of the donor site were undermined.   The donor site was closed in a primary fashion.  The pedicle was then rotated into position and sutured.  Once the tube was sutured into place, adequate blood supply was confirmed with blanching and refill.  The pedicle was then wrapped with xeroform gauze and dressed appropriately with a telfa and gauze bandage to ensure continued blood supply and protect the attached pedicle.
Estlander Flap (Lower To Upper Lip) Text: The defect of the lower lip was assessed and measured.  Given the location and size of the defect, an Estlander flap was deemed most appropriate. Using a sterile surgical marker, an appropriate Estlander flap was measured and drawn on the upper lip. Local anesthesia was then infiltrated. A scalpel was then used to incise the lateral aspect of the flap, through skin, muscle and mucosa, leaving the flap pedicled medially.  The flap was then rotated and positioned to fill the lower lip defect.  The flap was then sutured into place with a three layer technique, closing the orbicularis oris muscle layer with subcutaneous buried sutures, followed by a mucosal layer and an epidermal layer.
Interpolation Flap Text: A decision was made to reconstruct the defect utilizing an interpolation axial flap and a staged reconstruction.  A telfa template was made of the defect.  This telfa template was then used to outline the interpolation flap.  The donor area for the pedicle flap was then injected with anesthesia.  The flap was excised through the skin and subcutaneous tissue down to the layer of the underlying musculature.  The interpolation flap was carefully excised within this deep plane to maintain its blood supply.  The edges of the donor site were undermined.   The donor site was closed in a primary fashion.  The pedicle was then rotated into position and sutured.  Once the tube was sutured into place, adequate blood supply was confirmed with blanching and refill.  The pedicle was then wrapped with xeroform gauze and dressed appropriately with a telfa and gauze bandage to ensure continued blood supply and protect the attached pedicle.
Melolabial Interpolation Flap Text: A decision was made to reconstruct the defect utilizing an interpolation axial flap and a staged reconstruction.  A telfa template was made of the defect.  This telfa template was then used to outline the melolabial interpolation flap.  The donor area for the pedicle flap was then injected with anesthesia.  The flap was excised through the skin and subcutaneous tissue down to the layer of the underlying musculature.  The pedicle flap was carefully excised within this deep plane to maintain its blood supply.  The edges of the donor site were undermined.   The donor site was closed in a primary fashion.  The pedicle was then rotated into position and sutured.  Once the tube was sutured into place, adequate blood supply was confirmed with blanching and refill.  The pedicle was then wrapped with xeroform gauze and dressed appropriately with a telfa and gauze bandage to ensure continued blood supply and protect the attached pedicle.
Mastoid Interpolation Flap Text: A decision was made to reconstruct the defect utilizing an interpolation axial flap and a staged reconstruction.  A telfa template was made of the defect.  This telfa template was then used to outline the mastoid interpolation flap.  The donor area for the pedicle flap was then injected with anesthesia.  The flap was excised through the skin and subcutaneous tissue down to the layer of the underlying musculature.  The pedicle flap was carefully excised within this deep plane to maintain its blood supply.  The edges of the donor site were undermined.   The donor site was closed in a primary fashion.  The pedicle was then rotated into position and sutured.  Once the tube was sutured into place, adequate blood supply was confirmed with blanching and refill.  The pedicle was then wrapped with xeroform gauze and dressed appropriately with a telfa and gauze bandage to ensure continued blood supply and protect the attached pedicle.
Paramedian Forehead Flap Text: A decision was made to reconstruct the defect utilizing an interpolation axial flap and a staged reconstruction.  A telfa template was made of the defect.  This telfa template was then used to outline the paramedian forehead pedicle flap.  The donor area for the pedicle flap was then injected with anesthesia.  The flap was excised through the skin and subcutaneous tissue down to the layer of the underlying musculature.  The pedicle flap was carefully excised within this deep plane to maintain its blood supply.  The edges of the donor site were undermined.   The donor site was closed in a primary fashion.  The pedicle was then rotated into position and sutured.  Once the tube was sutured into place, adequate blood supply was confirmed with blanching and refill.  The pedicle was then wrapped with xeroform gauze and dressed appropriately with a telfa and gauze bandage to ensure continued blood supply and protect the attached pedicle.
Posterior Auricular Interpolation Flap Text: A decision was made to reconstruct the defect utilizing an interpolation axial flap and a staged reconstruction.  A telfa template was made of the defect.  This telfa template was then used to outline the posterior auricular interpolation flap.  The donor area for the pedicle flap was then injected with anesthesia.  The flap was excised through the skin and subcutaneous tissue down to the layer of the underlying musculature.  The pedicle flap was carefully excised within this deep plane to maintain its blood supply.  The edges of the donor site were undermined.   The donor site was closed in a primary fashion.  The pedicle was then rotated into position and sutured.  Once the tube was sutured into place, adequate blood supply was confirmed with blanching and refill.  The pedicle was then wrapped with xeroform gauze and dressed appropriately with a telfa and gauze bandage to ensure continued blood supply and protect the attached pedicle.
Cheiloplasty (Complex) Text: A decision was made to reconstruct the defect with a  cheiloplasty.  The defect was undermined extensively.  Additional obicularis oris muscle was excised with a 15 blade scalpel.  The defect was converted into a full thickness wedge to facilite a better cosmetic result.  Small vessels were then tied off with 5-0 monocyrl. The obicularis oris, superficial fascia, adipose and dermis were then reapproximated.  After the deeper layers were approximated the epidermis was reapproximated with particular care given to realign the vermilion border.
Cheiloplasty (Less Than 50%) Text: A decision was made to reconstruct the defect with a  cheiloplasty.  The defect was undermined extensively.  Additional obicularis oris muscle was excised with a 15 blade scalpel.  The defect was converted into a full thickness wedge, of less than 50% of the vertical height of the lip, to facilite a better cosmetic result.  Small vessels were then tied off with 5-0 monocyrl. The obicularis oris, superficial fascia, adipose and dermis were then reapproximated.  After the deeper layers were approximated the epidermis was reapproximated with particular care given to realign the vermilion border.
Ear Wedge Repair Text: A wedge excision was completed by carrying down an excision through the full thickness of the ear and cartilage with an inward facing Burow's triangle. The wound was then closed in a layered fashion.
Full Thickness Lip Wedge Repair (Flap) Text: Given the location of the defect and the proximity to free margins a full thickness wedge repair was deemed most appropriate.  Using a sterile surgical marker, the appropriate repair was drawn incorporating the defect and placing the expected incisions perpendicular to the vermilion border.  The vermilion border was also meticulously outlined to ensure appropriate reapproximation during the repair.  The area thus outlined was incised through and through with a #15 scalpel blade.  The muscularis and dermis were reaproximated with deep sutures following hemostasis. Care was taken to realign the vermilion border before proceeding with the superficial closure.  Once the vermilion was realigned the superfical and mucosal closure was finished.
Burow's Graft Text: The defect edges were debeveled with a #15 scalpel blade.  Given the location of the defect, shape of the defect, the proximity to free margins and the presence of a standing cone deformity a Burow's skin graft was deemed most appropriate. The standing cone was removed and this tissue was then trimmed to the shape of the primary defect. The adipose tissue was also removed until only dermis and epidermis were left.  The skin margins of the secondary defect were undermined to an appropriate distance in all directions utilizing iris scissors.  The secondary defect was closed with interrupted buried subcutaneous sutures.  The skin edges were then re-apposed with running  sutures.  The skin graft was then placed in the primary defect and oriented appropriately.
Cartilage Graft Text: The defect edges were debeveled with a #15 scalpel blade.  Given the location of the defect, shape of the defect, the fact the defect involved a full thickness cartilage defect a cartilage graft was deemed most appropriate.  An appropriate donor site was identified, cleansed, and anesthetized. The cartilage graft was then harvested and transferred to the recipient site, oriented appropriately and then sutured into place.  The secondary defect was then repaired using a primary closure.
Composite Graft Text: The defect edges were debeveled with a #15 scalpel blade.  Given the location of the defect, shape of the defect, the proximity to free margins and the fact the defect was full thickness a composite graft was deemed most appropriate.  The defect was outline and then transferred to the donor site.  A full thickness graft was then excised from the donor site. The graft was then placed in the primary defect, oriented appropriately and then sutured into place.  The secondary defect was then repaired using a primary closure.
Epidermal Autograft Text: The defect edges were debeveled with a #15 scalpel blade.  Given the location of the defect, shape of the defect and the proximity to free margins an epidermal autograft was deemed most appropriate.  Using a sterile surgical marker, the primary defect shape was transferred to the donor site. The epidermal graft was then harvested.  The skin graft was then placed in the primary defect and oriented appropriately.
Dermal Autograft Text: The defect edges were debeveled with a #15 scalpel blade.  Given the location of the defect, shape of the defect and the proximity to free margins a dermal autograft was deemed most appropriate.  Using a sterile surgical marker, the primary defect shape was transferred to the donor site. The area thus outlined was incised deep to adipose tissue with a #15 scalpel blade.  The harvested graft was then trimmed of adipose and epidermal tissue until only dermis was left.  The skin graft was then placed in the primary defect and oriented appropriately.
Ftsg Text: The defect edges were debeveled with a #15 scalpel blade.  Given the location of the defect, shape of the defect and the proximity to free margins a full thickness skin graft was deemed most appropriate.  Using a sterile surgical marker, the primary defect shape was transferred to the donor site. The area thus outlined was incised deep to adipose tissue with a #15 scalpel blade.  The harvested graft was then trimmed of adipose tissue until only dermis and epidermis was left.  The skin margins of the secondary defect were undermined to an appropriate distance in all directions utilizing iris scissors.  The secondary defect was closed with interrupted buried subcutaneous sutures.  The skin edges were then re-apposed with running  sutures.  The skin graft was then placed in the primary defect and oriented appropriately.
Pinch Graft Text: The defect edges were debeveled with a #15 scalpel blade. Given the location of the defect, shape of the defect and the proximity to free margins a pinch graft was deemed most appropriate. Using a sterile surgical marker, the primary defect shape was transferred to the donor site. The area thus outlined was incised deep to adipose tissue with a #15 scalpel blade.  The harvested graft was then trimmed of adipose tissue until only dermis and epidermis was left. The skin margins of the secondary defect were undermined to an appropriate distance in all directions utilizing iris scissors.  The secondary defect was closed with interrupted buried subcutaneous sutures.  The skin edges were then re-apposed with running  sutures.  The skin graft was then placed in the primary defect and oriented appropriately.
Skin Substitute Text: The defect edges were debeveled with a #15 scalpel blade.  Given the location of the defect, shape of the defect and the proximity to free margins a skin substitute graft was deemed most appropriate.  The graft material was trimmed to fit the size of the defect. The graft was then placed in the primary defect and oriented appropriately.
Split-Thickness Skin Graft Text: The defect edges were debeveled with a #15 scalpel blade.  Given the location of the defect, shape of the defect and the proximity to free margins a split thickness skin graft was deemed most appropriate.  Using a sterile surgical marker, the primary defect shape was transferred to the donor site. The split thickness graft was then harvested.  The skin graft was then placed in the primary defect and oriented appropriately.
Tissue Cultured Epidermal Autograft Text: The defect edges were debeveled with a #15 scalpel blade.  Given the location of the defect, shape of the defect and the proximity to free margins a tissue cultured epidermal autograft was deemed most appropriate.  The graft was then trimmed to fit the size of the defect.  The graft was then placed in the primary defect and oriented appropriately.
Xenograft Text: The defect edges were debeveled with a #15 scalpel blade.  Given the location of the defect, shape of the defect and the proximity to free margins a xenograft was deemed most appropriate.  The graft was then trimmed to fit the size of the defect.  The graft was then placed in the primary defect and oriented appropriately.
Complex Repair And Flap Additional Text (Will Appearing After The Standard Complex Repair Text): The complex repair was not sufficient to completely close the primary defect. The remaining additional defect was repaired with the flap mentioned below.
Complex Repair And Graft Additional Text (Will Appearing After The Standard Complex Repair Text): The complex repair was not sufficient to completely close the primary defect. The remaining additional defect was repaired with the graft mentioned below.
Eyelid Full Thickness Repair - 48692: The eyelid defect was full thickness which required a wedge repair of the eyelid. Special care was taken to ensure that the eyelid margin was realligned when placing sutures.
Eyelid Partial Thickness Repair - 40288: The eyelid defect was partial thickness which required a wedge repair of the eyelid. Special care was taken to ensure that the eyelid margin was realligned when placing sutures.
Intermediate Repair And Flap Additional Text (Will Appearing After The Standard Complex Repair Text): The intermediate repair was not sufficient to completely close the primary defect. The remaining additional defect was repaired with the flap mentioned below.
Intermediate Repair And Graft Additional Text (Will Appearing After The Standard Complex Repair Text): The intermediate repair was not sufficient to completely close the primary defect. The remaining additional defect was repaired with the graft mentioned below.
Localized Dermabrasion With 15 Blade Text: The patient was draped in routine manner.  Localized dermabrasion using a 15 blade was performed in routine manner to papillary dermis. This spot dermabrasion is being performed to complete skin cancer reconstruction. It also will eliminate the other sun damaged precancerous cells that are known to be part of the regional effect of a lifetime's worth of sun exposure. This localized dermabrasion is therapeutic and should not be considered cosmetic in any regard.
Localized Dermabrasion With Sand Papertext: The patient was draped in routine manner.  Localized dermabrasion using sterile sand paper was performed in routine manner to papillary dermis. This spot dermabrasion is being performed to complete skin cancer reconstruction. It also will eliminate the other sun damaged precancerous cells that are known to be part of the regional effect of a lifetime's worth of sun exposure. This localized dermabrasion is therapeutic and should not be considered cosmetic in any regard.
Localized Dermabrasion With Wire Brush Text: The patient was draped in routine manner.  Localized dermabrasion using 3 x 17 mm wire brush was performed in routine manner to papillary dermis. This spot dermabrasion is being performed to complete skin cancer reconstruction. It also will eliminate the other sun damaged precancerous cells that are known to be part of the regional effect of a lifetime's worth of sun exposure. This localized dermabrasion is therapeutic and should not be considered cosmetic in any regard.
Purse String (Simple) Text: Given the location of the defect and the characteristics of the surrounding skin a purse string closure was deemed most appropriate.  Undermining was performed circumfirentially around the surgical defect.  A purse string suture was then placed and tightened.
Purse String (Intermediate) Text: Given the location of the defect and the characteristics of the surrounding skin a purse string intermediate closure was deemed most appropriate.  Undermining was performed circumfirentially around the surgical defect.  A purse string suture was then placed and tightened.
Partial Purse String (Simple) Text: Given the location of the defect and the characteristics of the surrounding skin a simple purse string closure was deemed most appropriate.  Undermining was performed circumfirentially around the surgical defect.  A purse string suture was then placed and tightened. Wound tension only allowed a partial closure of the circular defect.
Partial Purse String (Intermediate) Text: Given the location of the defect and the characteristics of the surrounding skin an intermediate purse string closure was deemed most appropriate.  Undermining was performed circumfirentially around the surgical defect.  A purse string suture was then placed and tightened. Wound tension only allowed a partial closure of the circular defect.
Tarsorrhaphy Text: A tarsorrhaphy was performed using Frost sutures.
Manual Repair Warning Statement: We plan on removing the manually selected variable below in favor of our much easier automatic structured text blocks found in the previous tab. We decided to do this to help make the flow better and give you the full power of structured data. Manual selection is never going to be ideal in our platform and I would encourage you to avoid using manual selection from this point on, especially since I will be sunsetting this feature. It is important that you do one of two things with the customized text below. First, you can save all of the text in a word file so you can have it for future reference. Second, transfer the text to the appropriate area in the Library tab. Lastly, if there is a flap or graft type which we do not have you need to let us know right away so I can add it in before the variable is hidden. No need to panic, we plan to give you roughly 6 months to make the change.
Same Histology In Subsequent Stages Text: The pattern and morphology of the tumor is as described in the first stage.
No Residual Tumor Seen Histology Text: There were no malignant cells seen in the sections examined.
Inflammation Suggestive Of Cancer Camouflage Histology Text: There was a dense lymphocytic infiltrate which prevented adequate histologic evaluation of adjacent structures.
Bcc Histology Text: There were numerous aggregates of basaloid cells.
Bcc Infiltrative Histology Text: There were numerous aggregates of basaloid cells demonstrating an infiltrative pattern.
Mart-1 - Positive Histology Text: MART-1 staining demonstrates areas of higher density and clustering of melanocytes with Pagetoid spread upwards within the epidermis. The surgical margins are positive for tumor cells.
Mart-1 - Negative Histology Text: MART-1 staining demonstrates a normal density and pattern of melanocytes along the dermal-epidermal junction. The surgical margins are negative for tumor cells.
Information: Selecting Yes will display possible errors in your note based on the variables you have selected. This validation is only offered as a suggestion for you. PLEASE NOTE THAT THE VALIDATION TEXT WILL BE REMOVED WHEN YOU FINALIZE YOUR NOTE. IF YOU WANT TO FAX A PRELIMINARY NOTE YOU WILL NEED TO TOGGLE THIS TO 'NO' IF YOU DO NOT WANT IT IN YOUR FAXED NOTE.
Bill 59 Modifier?: No - Continue to Bill 79 Modifier

## 2024-12-09 ENCOUNTER — APPOINTMENT (OUTPATIENT)
Dept: URBAN - METROPOLITAN AREA CLINIC 236 | Facility: CLINIC | Age: 74
Setting detail: DERMATOLOGY
End: 2024-12-09

## 2024-12-09 DIAGNOSIS — Z48.02 ENCOUNTER FOR REMOVAL OF SUTURES: ICD-10-CM

## 2024-12-09 PROCEDURE — 99024 POSTOP FOLLOW-UP VISIT: CPT

## 2024-12-09 PROCEDURE — ? COUNSELING

## 2024-12-09 PROCEDURE — ? SUTURE REMOVAL (GLOBAL PERIOD)

## 2024-12-09 ASSESSMENT — LOCATION DETAILED DESCRIPTION DERM: LOCATION DETAILED: RIGHT SUPERIOR FOREHEAD

## 2024-12-09 ASSESSMENT — LOCATION SIMPLE DESCRIPTION DERM: LOCATION SIMPLE: RIGHT FOREHEAD

## 2024-12-09 ASSESSMENT — LOCATION ZONE DERM: LOCATION ZONE: FACE

## 2024-12-09 NOTE — PROCEDURE: SUTURE REMOVAL (GLOBAL PERIOD)
Detail Level: Detailed
Add 80653 Cpt? (Important Note: In 2017 The Use Of 83268 Is Being Tracked By Cms To Determine Future Global Period Reimbursement For Global Periods): yes

## 2025-01-23 ENCOUNTER — APPOINTMENT (OUTPATIENT)
Dept: URBAN - METROPOLITAN AREA CLINIC 236 | Facility: CLINIC | Age: 75
Setting detail: DERMATOLOGY
End: 2025-01-23

## 2025-01-23 DIAGNOSIS — C84.0 MYCOSIS FUNGOIDES: ICD-10-CM | Status: INADEQUATELY CONTROLLED

## 2025-01-23 DIAGNOSIS — Z85.828 PERSONAL HISTORY OF OTHER MALIGNANT NEOPLASM OF SKIN: ICD-10-CM

## 2025-01-23 DIAGNOSIS — D22 MELANOCYTIC NEVI: ICD-10-CM

## 2025-01-23 DIAGNOSIS — D18.0 HEMANGIOMA: ICD-10-CM

## 2025-01-23 DIAGNOSIS — L57.0 ACTINIC KERATOSIS: ICD-10-CM | Status: INADEQUATELY CONTROLLED

## 2025-01-23 DIAGNOSIS — L57.8 OTHER SKIN CHANGES DUE TO CHRONIC EXPOSURE TO NONIONIZING RADIATION: ICD-10-CM

## 2025-01-23 DIAGNOSIS — C86.6 PRIMARY CUTANEOUS CD30-POSITIVE T-CELL PROLIFERATIONS: ICD-10-CM

## 2025-01-23 DIAGNOSIS — L82.1 OTHER SEBORRHEIC KERATOSIS: ICD-10-CM

## 2025-01-23 PROBLEM — C86.60 PRIMARY CUTANEOUS CD30-POSITIVE T-CELL PROLIFERATIONS NOT HAVING ACHIEVED REMISSION: Status: ACTIVE | Noted: 2025-01-23

## 2025-01-23 PROBLEM — D18.01 HEMANGIOMA OF SKIN AND SUBCUTANEOUS TISSUE: Status: ACTIVE | Noted: 2025-01-23

## 2025-01-23 PROBLEM — D22.5 MELANOCYTIC NEVI OF TRUNK: Status: ACTIVE | Noted: 2025-01-23

## 2025-01-23 PROBLEM — L30.9 DERMATITIS, UNSPECIFIED: Status: ACTIVE | Noted: 2025-01-23

## 2025-01-23 PROCEDURE — ? TREATMENT REGIMEN

## 2025-01-23 PROCEDURE — ? PRESCRIPTION MEDICATION MANAGEMENT

## 2025-01-23 PROCEDURE — ? COUNSELING

## 2025-01-23 PROCEDURE — ? LIQUID NITROGEN

## 2025-01-23 PROCEDURE — ? LAB REPORTS REVIEWED

## 2025-01-23 PROCEDURE — 99214 OFFICE O/P EST MOD 30 MIN: CPT | Mod: 25

## 2025-01-23 PROCEDURE — 11102 TANGNTL BX SKIN SINGLE LES: CPT | Mod: 59

## 2025-01-23 PROCEDURE — ? FULL BODY SKIN EXAM

## 2025-01-23 PROCEDURE — 17004 DESTROY PREMAL LESIONS 15/>: CPT

## 2025-01-23 PROCEDURE — ? BIOPSY BY SHAVE METHOD

## 2025-01-23 ASSESSMENT — LOCATION ZONE DERM
LOCATION ZONE: LEG
LOCATION ZONE: SCALP
LOCATION ZONE: TRUNK
LOCATION ZONE: FACE

## 2025-01-23 ASSESSMENT — LOCATION SIMPLE DESCRIPTION DERM
LOCATION SIMPLE: ABDOMEN
LOCATION SIMPLE: LEFT UPPER BACK
LOCATION SIMPLE: CHEST
LOCATION SIMPLE: LEFT LOWER BACK
LOCATION SIMPLE: LOWER BACK
LOCATION SIMPLE: LEFT FOREHEAD
LOCATION SIMPLE: SCALP
LOCATION SIMPLE: LEFT THIGH
LOCATION SIMPLE: RIGHT ZYGOMA
LOCATION SIMPLE: RIGHT SCALP
LOCATION SIMPLE: RIGHT FOREHEAD
LOCATION SIMPLE: RIGHT THIGH
LOCATION SIMPLE: LEFT CHEEK
LOCATION SIMPLE: RIGHT UPPER BACK
LOCATION SIMPLE: RIGHT CHEEK

## 2025-01-23 ASSESSMENT — LOCATION DETAILED DESCRIPTION DERM
LOCATION DETAILED: RIGHT SUPERIOR PARIETAL SCALP
LOCATION DETAILED: LEFT SUPERIOR LATERAL LOWER BACK
LOCATION DETAILED: RIGHT INFERIOR MEDIAL UPPER BACK
LOCATION DETAILED: LEFT SUPERIOR PARIETAL SCALP
LOCATION DETAILED: LEFT SUPERIOR CENTRAL MALAR CHEEK
LOCATION DETAILED: SUPERIOR LUMBAR SPINE
LOCATION DETAILED: RIGHT LATERAL MALAR CHEEK
LOCATION DETAILED: LEFT INFERIOR CENTRAL MALAR CHEEK
LOCATION DETAILED: RIGHT FOREHEAD
LOCATION DETAILED: LEFT LATERAL ABDOMEN
LOCATION DETAILED: LEFT ANTERIOR PROXIMAL THIGH
LOCATION DETAILED: RIGHT CENTRAL PARIETAL SCALP
LOCATION DETAILED: RIGHT CENTRAL FRONTAL SCALP
LOCATION DETAILED: RIGHT ANTERIOR PROXIMAL THIGH
LOCATION DETAILED: RIGHT SUPERIOR LATERAL BUCCAL CHEEK
LOCATION DETAILED: LEFT CENTRAL MALAR CHEEK
LOCATION DETAILED: RIGHT CENTRAL ZYGOMA
LOCATION DETAILED: LEFT FOREHEAD
LOCATION DETAILED: LEFT MEDIAL SUPERIOR CHEST
LOCATION DETAILED: LEFT MEDIAL UPPER BACK
LOCATION DETAILED: RIGHT LATERAL FOREHEAD

## 2025-01-23 ASSESSMENT — SEVERITY ASSESSMENT: SEVERITY: MODERATE

## 2025-01-23 ASSESSMENT — BSA RASH: BSA RASH: 8

## 2025-01-23 NOTE — PROCEDURE: BIOPSY BY SHAVE METHOD
Detail Level: Detailed
Depth Of Biopsy: dermis
Was A Bandage Applied: Yes
Size Of Lesion In Cm: 0
Biopsy Type: H and E
Biopsy Method: Dermablade
Anesthesia Type: 1% lidocaine with epinephrine
Anesthesia Volume In Cc: 0.7
Hemostasis: TCA 35%
Wound Care: Polysporin ointment
Dressing: Band-Aid
Destruction After The Procedure: No
Type Of Destruction Used: Curettage
Curettage Text: The wound bed was treated with curettage after the biopsy was performed.
Cryotherapy Text: The wound bed was treated with cryotherapy after the biopsy was performed.
Electrodesiccation Text: The wound bed was treated with electrodesiccation after the biopsy was performed.
Electrodesiccation And Curettage Text: The wound bed was treated with electrodesiccation and curettage after the biopsy was performed.
Silver Nitrate Text: The wound bed was treated with silver nitrate after the biopsy was performed.
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 
Medical Necessity Information: It is in your best interest to select a reason for this procedure from the list below. All of these items fulfill various CMS LCD requirements except the new and changing color options.
Consent: Verbal consent and/or verbal consent was obtained and risks were reviewed including but not limited to scarring, infection, bleeding, scabbing, incomplete removal, nerve damage and allergy to anesthesia.
Post-Care Instructions: I reviewed with the patient in detail post-care instructions. Patient is to keep the biopsy site dry overnight, and then apply Polysporin/ Aquaphor twice daily until healed. Avoid Neosporin due to allergy risk.
Notification Instructions: Patient will be notified of biopsy results. However, patient instructed to call the office if not contacted within 2 weeks.
Billing Type: Third-Party Bill
Information: Selecting Yes will display possible errors in your note based on the variables you have selected. This validation is only offered as a suggestion for you. PLEASE NOTE THAT THE VALIDATION TEXT WILL BE REMOVED WHEN YOU FINALIZE YOUR NOTE. IF YOU WANT TO FAX A PRELIMINARY NOTE YOU WILL NEED TO TOGGLE THIS TO 'NO' IF YOU DO NOT WANT IT IN YOUR FAXED NOTE.

## 2025-01-23 NOTE — PROCEDURE: PRESCRIPTION MEDICATION MANAGEMENT
Render In Strict Bullet Format?: No
Plan: Advised to avoid NSAIDs while on Methotrexate \\n\\nTake folic acid daily.\\n\\nA biopsy was done today to confirm Lyp diagnosis not MF.  \\n\\nFollow up with oncology yearly
Continue Regimen: Triamcinolone to affected areas 3x/week
Detail Level: Zone
Modify Regimen: increase Methotrexate take 6 tabs weekly. Was taking 4 tabs Qweek

## 2025-01-23 NOTE — PROCEDURE: LIQUID NITROGEN
Application Tool (Optional): Liquid Nitrogen Sprayer
Show Applicator Variable?: Yes
Number Of Freeze-Thaw Cycles: 1 freeze-thaw cycle
Duration Of Freeze Thaw-Cycle (Seconds): 5
Consent: The patient's consent was obtained including but not limited to risks of crusting, scabbing, blistering, scarring, darker or lighter pigmentary change, recurrence, incomplete removal and infection.
Detail Level: Detailed
Render Post-Care Instructions In Note?: no
Post-Care Instructions: I reviewed with the patient in detail post-care instructions. Patient is to wear sunprotection, and avoid picking at any of the treated lesions. Pt may apply Vaseline to crusted or scabbing areas.

## 2025-05-01 ENCOUNTER — APPOINTMENT (OUTPATIENT)
Dept: URBAN - METROPOLITAN AREA CLINIC 236 | Facility: CLINIC | Age: 75
Setting detail: DERMATOLOGY
End: 2025-05-01

## 2025-05-01 DIAGNOSIS — C84.0 MYCOSIS FUNGOIDES: ICD-10-CM

## 2025-05-01 PROBLEM — C84.00 MYCOSIS FUNGOIDES, UNSPECIFIED SITE: Status: ACTIVE | Noted: 2025-05-01

## 2025-05-01 PROCEDURE — ? PRESCRIPTION MEDICATION MANAGEMENT

## 2025-05-01 PROCEDURE — 99214 OFFICE O/P EST MOD 30 MIN: CPT

## 2025-05-01 PROCEDURE — ? COUNSELING

## 2025-05-01 PROCEDURE — ? PRESCRIPTION

## 2025-05-01 RX ORDER — TRIAMCINOLONE ACETONIDE 1 MG/G
CREAM TOPICAL BID
Qty: 454 | Refills: 3 | Status: ACTIVE

## 2025-05-01 ASSESSMENT — LOCATION DETAILED DESCRIPTION DERM
LOCATION DETAILED: RIGHT ANTERIOR DISTAL THIGH
LOCATION DETAILED: LEFT BUTTOCK
LOCATION DETAILED: LEFT ANTERIOR PROXIMAL THIGH
LOCATION DETAILED: RIGHT PROXIMAL PRETIBIAL REGION
LOCATION DETAILED: LEFT PROXIMAL PRETIBIAL REGION

## 2025-05-01 ASSESSMENT — LOCATION SIMPLE DESCRIPTION DERM
LOCATION SIMPLE: RIGHT THIGH
LOCATION SIMPLE: RIGHT PRETIBIAL REGION
LOCATION SIMPLE: LEFT PRETIBIAL REGION
LOCATION SIMPLE: LEFT BUTTOCK
LOCATION SIMPLE: LEFT THIGH

## 2025-05-01 ASSESSMENT — LOCATION ZONE DERM
LOCATION ZONE: LEG
LOCATION ZONE: TRUNK

## 2025-05-01 ASSESSMENT — BSA RASH: BSA RASH: 9

## 2025-05-01 NOTE — PROCEDURE: PRESCRIPTION MEDICATION MANAGEMENT
Initiate Treatment: triamcinolone acetonide 0.1 % topical cream BID\\n Apply twice daily to rash on legs and trunk for up to 2 weeks/month\\n\\nMethotrexate 2.5mg tablets take 6 every week (as prescribed by Dr. Bina Peralta)
Render In Strict Bullet Format?: No
Detail Level: Zone
Plan: Patient is also being managed with Dr. Bina Peralta. patient has an appointment with her on 6/6/25\\n\\nRecords from Dr. Peralta requested \\n\\nPer patient latest PET scan was negative

## 2025-05-05 ENCOUNTER — APPOINTMENT (OUTPATIENT)
Dept: URBAN - METROPOLITAN AREA CLINIC 236 | Facility: CLINIC | Age: 75
Setting detail: DERMATOLOGY
End: 2025-05-05

## 2025-05-05 DIAGNOSIS — C84.0 MYCOSIS FUNGOIDES: ICD-10-CM | Status: INADEQUATELY CONTROLLED

## 2025-05-05 PROBLEM — C84.00 MYCOSIS FUNGOIDES, UNSPECIFIED SITE: Status: ACTIVE | Noted: 2025-05-05

## 2025-05-05 PROCEDURE — ? COUNSELING

## 2025-05-05 PROCEDURE — ? PHOTOTHERAPY TREATMENT

## 2025-05-05 PROCEDURE — 96900 ACTINOTHERAPY UV LIGHT: CPT

## 2025-05-05 ASSESSMENT — LOCATION SIMPLE DESCRIPTION DERM
LOCATION SIMPLE: LEFT BUTTOCK
LOCATION SIMPLE: LEFT PRETIBIAL REGION
LOCATION SIMPLE: LEFT THIGH
LOCATION SIMPLE: RIGHT THIGH
LOCATION SIMPLE: RIGHT PRETIBIAL REGION

## 2025-05-05 ASSESSMENT — LOCATION DETAILED DESCRIPTION DERM
LOCATION DETAILED: LEFT BUTTOCK
LOCATION DETAILED: LEFT PROXIMAL PRETIBIAL REGION
LOCATION DETAILED: RIGHT ANTERIOR DISTAL THIGH
LOCATION DETAILED: RIGHT PROXIMAL PRETIBIAL REGION
LOCATION DETAILED: LEFT ANTERIOR PROXIMAL THIGH

## 2025-05-05 ASSESSMENT — LOCATION ZONE DERM
LOCATION ZONE: TRUNK
LOCATION ZONE: LEG

## 2025-05-05 NOTE — PROCEDURE: PHOTOTHERAPY TREATMENT
Protocol For Protocol For Photochemotherapy For Severe Photoresponsive Dermatoses: Bath Puva: The patient received Photochemotherapy for severe photoresponsive dermatoses: Bath PUVA requiring at least 4 to 8 hours of care under direct physician supervision.
Protocol For Photochemotherapy For Severe Photoresponsive Dermatoses: Petrolatum And Nbuvb: The patient received Photochemotherapy for severe photoresponsive dermatoses: Petrolatum and NBUVB requiring at least 4 to 8 hours of care under direct physician supervision.
Protocol For Broad Band Uvb: The patient received Broad Band UVB.
Protocol For Photochemotherapy For Severe Photoresponsive Dermatoses: Tar And Nbuvb (Goeckerman Treatment): The patient received Photochemotherapy for severe photoresponsive dermatoses: Tar and NBUVB (Goeckerman treatment) requiring at least 4 to 8 hours of care under direct physician supervision.
Treatment Number: 1
Protocol For Photochemotherapy For Severe Photoresponsive Dermatoses: Tar And Broad Band Uvb (Goeckerman Treatment): The patient received Photochemotherapy for severe photoresponsive dermatoses: Tar and Broad Band UVB (Goeckerman treatment) requiring at least 4 to 8 hours of care under direct physician supervision.
Protocol For Photochemotherapy: Petrolatum And Nbuvb: The patient received Photochemotherapy: Petrolatum and NBUVB (petrolatum applied to all lesions prior to phototherapy).
Post-Care Instructions: I reviewed with the patient in detail post-care instructions. Patient is to wear sun protection. Patients may expect sunburn like redness, discomfort and scabbing.
Protocol For Nb Uva: The patient received NB UVA.
Protocol For Photochemotherapy: Baby Oil And Nbuvb: The patient received Photochemotherapy: Baby Oil and NBUVB (baby oil applied to all lesions prior to phototherapy).
Protocol For Photochemotherapy: Tar And Broad Band Uvb (Goeckerman Treatment): The patient received Photochemotherapy: Tar and Broad Band UVB (Goeckerman treatment).
Protocol For Nbuvb: The patient received NBUVB.
Consent: Written consent obtained.  The risks were reviewed with the patient including but not limited to: burn, pigmentary changes, pain, blistering, scabbing, redness, increased risk of skin cancers, and the remote possibility of scarring.
Protocol For Photochemotherapy For Severe Photoresponsive Dermatoses: Petrolatum And Broad Band Uvb: The patient received Photochemotherapyfor severe photoresponsive dermatoses: Petrolatum and Broad Band UVB requiring at least 4 to 8 hours of care under direct physician supervision.
Total Body Energy: 50
Total Body Time: 10
Protocol For Photochemotherapy For Severe Photoresponsive Dermatoses: Puva: The patient received Photochemotherapy for severe photoresponsive dermatoses: PUVA requiring at least 4 to 8 hours of care under direct physician supervision.
Protocol: NBUVB
Protocol For Puva: The patient received PUVA.
Protocol For Photochemotherapy: Petrolatum And Broad Band Uvb: The patient received Photochemotherapy: Petrolatum and Broad Band UVB.
Protocol For Photochemotherapy: Mineral Oil And Nbuvb: The patient received Photochemotherapy: Mineral Oil and NBUVB (mineral oil applied to all lesions prior to phototherapy).
Render Post-Care In The Note: no
Protocol For Photochemotherapy: Tar And Nbuvb (Goeckerman Treatment): The patient received Photochemotherapy: Tar and NBUVB (Goeckerman treatment).
Protocol For Uva1: The patient received UVA1.
Protocol For Bath Puva: The patient received Bath PUVA.
Protocol For Photochemotherapy: Mineral Oil And Broad Band Uvb: The patient received Photochemotherapy: Mineral Oil and Broad Band UVB.
Protocol For Uva: The patient received UVA.
Detail Level: Zone
Irradiance (Optional- Include Units): 5.2
Protocol For Photochemotherapy: Triamcinolone Ointment And Nbuvb: The patient received Photochemotherapy: Triamcinolone and NBUVB (triamcinolone ointment applied to all lesions prior to phototherapy).

## 2025-05-07 ENCOUNTER — APPOINTMENT (OUTPATIENT)
Dept: URBAN - METROPOLITAN AREA CLINIC 236 | Facility: CLINIC | Age: 75
Setting detail: DERMATOLOGY
End: 2025-05-07

## 2025-05-07 DIAGNOSIS — C84.0 MYCOSIS FUNGOIDES: ICD-10-CM | Status: INADEQUATELY CONTROLLED

## 2025-05-07 PROBLEM — C84.00 MYCOSIS FUNGOIDES, UNSPECIFIED SITE: Status: ACTIVE | Noted: 2025-05-07

## 2025-05-07 PROCEDURE — 96900 ACTINOTHERAPY UV LIGHT: CPT

## 2025-05-07 PROCEDURE — ? PHOTOTHERAPY TREATMENT

## 2025-05-07 PROCEDURE — ? COUNSELING

## 2025-05-07 ASSESSMENT — LOCATION SIMPLE DESCRIPTION DERM
LOCATION SIMPLE: RIGHT THIGH
LOCATION SIMPLE: LEFT BUTTOCK
LOCATION SIMPLE: RIGHT PRETIBIAL REGION
LOCATION SIMPLE: LEFT THIGH
LOCATION SIMPLE: LEFT PRETIBIAL REGION

## 2025-05-07 ASSESSMENT — LOCATION ZONE DERM
LOCATION ZONE: TRUNK
LOCATION ZONE: LEG

## 2025-05-07 ASSESSMENT — LOCATION DETAILED DESCRIPTION DERM
LOCATION DETAILED: LEFT ANTERIOR PROXIMAL THIGH
LOCATION DETAILED: RIGHT PROXIMAL PRETIBIAL REGION
LOCATION DETAILED: LEFT PROXIMAL PRETIBIAL REGION
LOCATION DETAILED: RIGHT ANTERIOR DISTAL THIGH
LOCATION DETAILED: LEFT BUTTOCK

## 2025-05-07 NOTE — PROCEDURE: PHOTOTHERAPY TREATMENT
Protocol For Protocol For Photochemotherapy For Severe Photoresponsive Dermatoses: Bath Puva: The patient received Photochemotherapy for severe photoresponsive dermatoses: Bath PUVA requiring at least 4 to 8 hours of care under direct physician supervision.
Protocol For Photochemotherapy For Severe Photoresponsive Dermatoses: Petrolatum And Nbuvb: The patient received Photochemotherapy for severe photoresponsive dermatoses: Petrolatum and NBUVB requiring at least 4 to 8 hours of care under direct physician supervision.
Protocol For Broad Band Uvb: The patient received Broad Band UVB.
Protocol For Photochemotherapy For Severe Photoresponsive Dermatoses: Tar And Nbuvb (Goeckerman Treatment): The patient received Photochemotherapy for severe photoresponsive dermatoses: Tar and NBUVB (Goeckerman treatment) requiring at least 4 to 8 hours of care under direct physician supervision.
Treatment Number: 2
Protocol For Photochemotherapy For Severe Photoresponsive Dermatoses: Tar And Broad Band Uvb (Goeckerman Treatment): The patient received Photochemotherapy for severe photoresponsive dermatoses: Tar and Broad Band UVB (Goeckerman treatment) requiring at least 4 to 8 hours of care under direct physician supervision.
Protocol For Photochemotherapy: Petrolatum And Nbuvb: The patient received Photochemotherapy: Petrolatum and NBUVB (petrolatum applied to all lesions prior to phototherapy).
Post-Care Instructions: I reviewed with the patient in detail post-care instructions. Patient is to wear sun protection. Patients may expect sunburn like redness, discomfort and scabbing.
Protocol For Nb Uva: The patient received NB UVA.
Protocol For Photochemotherapy: Baby Oil And Nbuvb: The patient received Photochemotherapy: Baby Oil and NBUVB (baby oil applied to all lesions prior to phototherapy).
Protocol For Photochemotherapy: Tar And Broad Band Uvb (Goeckerman Treatment): The patient received Photochemotherapy: Tar and Broad Band UVB (Goeckerman treatment).
Protocol For Nbuvb: The patient received NBUVB.
Consent: Written consent obtained.  The risks were reviewed with the patient including but not limited to: burn, pigmentary changes, pain, blistering, scabbing, redness, increased risk of skin cancers, and the remote possibility of scarring.
Protocol For Photochemotherapy For Severe Photoresponsive Dermatoses: Petrolatum And Broad Band Uvb: The patient received Photochemotherapyfor severe photoresponsive dermatoses: Petrolatum and Broad Band UVB requiring at least 4 to 8 hours of care under direct physician supervision.
Total Body Energy: 70
Total Body Time: 13
Protocol For Photochemotherapy For Severe Photoresponsive Dermatoses: Puva: The patient received Photochemotherapy for severe photoresponsive dermatoses: PUVA requiring at least 4 to 8 hours of care under direct physician supervision.
Protocol: NBUVB
Protocol For Puva: The patient received PUVA.
Protocol For Photochemotherapy: Petrolatum And Broad Band Uvb: The patient received Photochemotherapy: Petrolatum and Broad Band UVB.
Protocol For Photochemotherapy: Mineral Oil And Nbuvb: The patient received Photochemotherapy: Mineral Oil and NBUVB (mineral oil applied to all lesions prior to phototherapy).
Render Post-Care In The Note: no
Protocol For Photochemotherapy: Tar And Nbuvb (Goeckerman Treatment): The patient received Photochemotherapy: Tar and NBUVB (Goeckerman treatment).
Protocol For Uva1: The patient received UVA1.
Protocol For Bath Puva: The patient received Bath PUVA.
Protocol For Photochemotherapy: Mineral Oil And Broad Band Uvb: The patient received Photochemotherapy: Mineral Oil and Broad Band UVB.
Protocol For Uva: The patient received UVA.
Detail Level: Zone
Irradiance (Optional- Include Units): 5.2
Protocol For Photochemotherapy: Triamcinolone Ointment And Nbuvb: The patient received Photochemotherapy: Triamcinolone and NBUVB (triamcinolone ointment applied to all lesions prior to phototherapy).

## 2025-05-12 ENCOUNTER — APPOINTMENT (OUTPATIENT)
Dept: URBAN - METROPOLITAN AREA CLINIC 236 | Facility: CLINIC | Age: 75
Setting detail: DERMATOLOGY
End: 2025-05-12

## 2025-05-12 DIAGNOSIS — C84.0 MYCOSIS FUNGOIDES: ICD-10-CM | Status: INADEQUATELY CONTROLLED

## 2025-05-12 PROBLEM — C84.00 MYCOSIS FUNGOIDES, UNSPECIFIED SITE: Status: ACTIVE | Noted: 2025-05-12

## 2025-05-12 PROCEDURE — ? COUNSELING

## 2025-05-12 PROCEDURE — 96900 ACTINOTHERAPY UV LIGHT: CPT

## 2025-05-12 PROCEDURE — ? PHOTOTHERAPY TREATMENT

## 2025-05-12 ASSESSMENT — LOCATION DETAILED DESCRIPTION DERM
LOCATION DETAILED: RIGHT PROXIMAL PRETIBIAL REGION
LOCATION DETAILED: LEFT PROXIMAL PRETIBIAL REGION
LOCATION DETAILED: RIGHT ANTERIOR DISTAL THIGH
LOCATION DETAILED: LEFT BUTTOCK
LOCATION DETAILED: LEFT ANTERIOR PROXIMAL THIGH

## 2025-05-12 ASSESSMENT — LOCATION SIMPLE DESCRIPTION DERM
LOCATION SIMPLE: RIGHT PRETIBIAL REGION
LOCATION SIMPLE: LEFT THIGH
LOCATION SIMPLE: LEFT BUTTOCK
LOCATION SIMPLE: LEFT PRETIBIAL REGION
LOCATION SIMPLE: RIGHT THIGH

## 2025-05-12 ASSESSMENT — LOCATION ZONE DERM
LOCATION ZONE: TRUNK
LOCATION ZONE: LEG

## 2025-05-12 NOTE — PROCEDURE: PHOTOTHERAPY TREATMENT
Protocol For Protocol For Photochemotherapy For Severe Photoresponsive Dermatoses: Bath Puva: The patient received Photochemotherapy for severe photoresponsive dermatoses: Bath PUVA requiring at least 4 to 8 hours of care under direct physician supervision.
Protocol For Photochemotherapy For Severe Photoresponsive Dermatoses: Petrolatum And Nbuvb: The patient received Photochemotherapy for severe photoresponsive dermatoses: Petrolatum and NBUVB requiring at least 4 to 8 hours of care under direct physician supervision.
Protocol For Broad Band Uvb: The patient received Broad Band UVB.
Protocol For Photochemotherapy For Severe Photoresponsive Dermatoses: Tar And Nbuvb (Goeckerman Treatment): The patient received Photochemotherapy for severe photoresponsive dermatoses: Tar and NBUVB (Goeckerman treatment) requiring at least 4 to 8 hours of care under direct physician supervision.
Treatment Number: 3
Protocol For Photochemotherapy For Severe Photoresponsive Dermatoses: Tar And Broad Band Uvb (Goeckerman Treatment): The patient received Photochemotherapy for severe photoresponsive dermatoses: Tar and Broad Band UVB (Goeckerman treatment) requiring at least 4 to 8 hours of care under direct physician supervision.
Protocol For Photochemotherapy: Petrolatum And Nbuvb: The patient received Photochemotherapy: Petrolatum and NBUVB (petrolatum applied to all lesions prior to phototherapy).
Post-Care Instructions: I reviewed with the patient in detail post-care instructions. Patient is to wear sun protection. Patients may expect sunburn like redness, discomfort and scabbing.
Protocol For Nb Uva: The patient received NB UVA.
Protocol For Photochemotherapy: Baby Oil And Nbuvb: The patient received Photochemotherapy: Baby Oil and NBUVB (baby oil applied to all lesions prior to phototherapy).
Protocol For Photochemotherapy: Tar And Broad Band Uvb (Goeckerman Treatment): The patient received Photochemotherapy: Tar and Broad Band UVB (Goeckerman treatment).
Protocol For Nbuvb: The patient received NBUVB.
Consent: Written consent obtained.  The risks were reviewed with the patient including but not limited to: burn, pigmentary changes, pain, blistering, scabbing, redness, increased risk of skin cancers, and the remote possibility of scarring.
Protocol For Photochemotherapy For Severe Photoresponsive Dermatoses: Petrolatum And Broad Band Uvb: The patient received Photochemotherapyfor severe photoresponsive dermatoses: Petrolatum and Broad Band UVB requiring at least 4 to 8 hours of care under direct physician supervision.
Total Body Energy: 70
Total Body Time: 0:17
Protocol For Photochemotherapy For Severe Photoresponsive Dermatoses: Puva: The patient received Photochemotherapy for severe photoresponsive dermatoses: PUVA requiring at least 4 to 8 hours of care under direct physician supervision.
Protocol: NBUVB
Protocol For Puva: The patient received PUVA.
Protocol For Photochemotherapy: Petrolatum And Broad Band Uvb: The patient received Photochemotherapy: Petrolatum and Broad Band UVB.
Protocol For Photochemotherapy: Mineral Oil And Nbuvb: The patient received Photochemotherapy: Mineral Oil and NBUVB (mineral oil applied to all lesions prior to phototherapy).
Render Post-Care In The Note: no
Protocol For Photochemotherapy: Tar And Nbuvb (Goeckerman Treatment): The patient received Photochemotherapy: Tar and NBUVB (Goeckerman treatment).
Protocol For Uva1: The patient received UVA1.
Protocol For Bath Puva: The patient received Bath PUVA.
Protocol For Photochemotherapy: Mineral Oil And Broad Band Uvb: The patient received Photochemotherapy: Mineral Oil and Broad Band UVB.
Protocol For Uva: The patient received UVA.
Detail Level: Zone
Irradiance (Optional- Include Units): 5.2
Protocol For Photochemotherapy: Triamcinolone Ointment And Nbuvb: The patient received Photochemotherapy: Triamcinolone and NBUVB (triamcinolone ointment applied to all lesions prior to phototherapy).

## 2025-05-14 ENCOUNTER — APPOINTMENT (OUTPATIENT)
Dept: URBAN - METROPOLITAN AREA CLINIC 236 | Facility: CLINIC | Age: 75
Setting detail: DERMATOLOGY
End: 2025-05-14

## 2025-05-14 DIAGNOSIS — C84.0 MYCOSIS FUNGOIDES: ICD-10-CM

## 2025-05-14 PROBLEM — C84.00 MYCOSIS FUNGOIDES, UNSPECIFIED SITE: Status: ACTIVE | Noted: 2025-05-14

## 2025-05-14 PROCEDURE — 96900 ACTINOTHERAPY UV LIGHT: CPT

## 2025-05-14 PROCEDURE — ? COUNSELING

## 2025-05-14 PROCEDURE — ? PHOTOTHERAPY TREATMENT

## 2025-05-14 ASSESSMENT — LOCATION DETAILED DESCRIPTION DERM
LOCATION DETAILED: LEFT PROXIMAL PRETIBIAL REGION
LOCATION DETAILED: RIGHT PROXIMAL PRETIBIAL REGION
LOCATION DETAILED: LEFT ANTERIOR PROXIMAL THIGH
LOCATION DETAILED: RIGHT ANTERIOR DISTAL THIGH
LOCATION DETAILED: LEFT BUTTOCK

## 2025-05-14 ASSESSMENT — LOCATION SIMPLE DESCRIPTION DERM
LOCATION SIMPLE: RIGHT PRETIBIAL REGION
LOCATION SIMPLE: LEFT THIGH
LOCATION SIMPLE: RIGHT THIGH
LOCATION SIMPLE: LEFT PRETIBIAL REGION
LOCATION SIMPLE: LEFT BUTTOCK

## 2025-05-14 ASSESSMENT — LOCATION ZONE DERM
LOCATION ZONE: TRUNK
LOCATION ZONE: LEG

## 2025-05-14 NOTE — PROCEDURE: PHOTOTHERAPY TREATMENT
Protocol For Protocol For Photochemotherapy For Severe Photoresponsive Dermatoses: Bath Puva: The patient received Photochemotherapy for severe photoresponsive dermatoses: Bath PUVA requiring at least 4 to 8 hours of care under direct physician supervision.
Protocol For Photochemotherapy For Severe Photoresponsive Dermatoses: Petrolatum And Nbuvb: The patient received Photochemotherapy for severe photoresponsive dermatoses: Petrolatum and NBUVB requiring at least 4 to 8 hours of care under direct physician supervision.
Protocol For Broad Band Uvb: The patient received Broad Band UVB.
Protocol For Photochemotherapy For Severe Photoresponsive Dermatoses: Tar And Nbuvb (Goeckerman Treatment): The patient received Photochemotherapy for severe photoresponsive dermatoses: Tar and NBUVB (Goeckerman treatment) requiring at least 4 to 8 hours of care under direct physician supervision.
Treatment Number: 4
Protocol For Photochemotherapy For Severe Photoresponsive Dermatoses: Tar And Broad Band Uvb (Goeckerman Treatment): The patient received Photochemotherapy for severe photoresponsive dermatoses: Tar and Broad Band UVB (Goeckerman treatment) requiring at least 4 to 8 hours of care under direct physician supervision.
Protocol For Photochemotherapy: Petrolatum And Nbuvb: The patient received Photochemotherapy: Petrolatum and NBUVB (petrolatum applied to all lesions prior to phototherapy).
Post-Care Instructions: I reviewed with the patient in detail post-care instructions. Patient is to wear sun protection. Patients may expect sunburn like redness, discomfort and scabbing.
Protocol For Nb Uva: The patient received NB UVA.
Protocol For Photochemotherapy: Baby Oil And Nbuvb: The patient received Photochemotherapy: Baby Oil and NBUVB (baby oil applied to all lesions prior to phototherapy).
Protocol For Photochemotherapy: Tar And Broad Band Uvb (Goeckerman Treatment): The patient received Photochemotherapy: Tar and Broad Band UVB (Goeckerman treatment).
Protocol For Nbuvb: The patient received NBUVB.
Consent: Written consent obtained.  The risks were reviewed with the patient including but not limited to: burn, pigmentary changes, pain, blistering, scabbing, redness, increased risk of skin cancers, and the remote possibility of scarring.
Protocol For Photochemotherapy For Severe Photoresponsive Dermatoses: Petrolatum And Broad Band Uvb: The patient received Photochemotherapyfor severe photoresponsive dermatoses: Petrolatum and Broad Band UVB requiring at least 4 to 8 hours of care under direct physician supervision.
Total Body Energy: 90
Total Body Time: 0:17
Protocol For Photochemotherapy For Severe Photoresponsive Dermatoses: Puva: The patient received Photochemotherapy for severe photoresponsive dermatoses: PUVA requiring at least 4 to 8 hours of care under direct physician supervision.
Protocol: NBUVB
Protocol For Puva: The patient received PUVA.
Protocol For Photochemotherapy: Petrolatum And Broad Band Uvb: The patient received Photochemotherapy: Petrolatum and Broad Band UVB.
Protocol For Photochemotherapy: Mineral Oil And Nbuvb: The patient received Photochemotherapy: Mineral Oil and NBUVB (mineral oil applied to all lesions prior to phototherapy).
Render Post-Care In The Note: no
Protocol For Photochemotherapy: Tar And Nbuvb (Goeckerman Treatment): The patient received Photochemotherapy: Tar and NBUVB (Goeckerman treatment).
Protocol For Uva1: The patient received UVA1.
Protocol For Bath Puva: The patient received Bath PUVA.
Protocol For Photochemotherapy: Mineral Oil And Broad Band Uvb: The patient received Photochemotherapy: Mineral Oil and Broad Band UVB.
Protocol For Uva: The patient received UVA.
Detail Level: Zone
Irradiance (Optional- Include Units): 5.2
Protocol For Photochemotherapy: Triamcinolone Ointment And Nbuvb: The patient received Photochemotherapy: Triamcinolone and NBUVB (triamcinolone ointment applied to all lesions prior to phototherapy).

## 2025-05-16 ENCOUNTER — APPOINTMENT (OUTPATIENT)
Dept: URBAN - METROPOLITAN AREA CLINIC 236 | Facility: CLINIC | Age: 75
Setting detail: DERMATOLOGY
End: 2025-05-16

## 2025-05-16 DIAGNOSIS — C84.0 MYCOSIS FUNGOIDES: ICD-10-CM

## 2025-05-16 PROBLEM — C84.00 MYCOSIS FUNGOIDES, UNSPECIFIED SITE: Status: ACTIVE | Noted: 2025-05-16

## 2025-05-16 PROCEDURE — 96900 ACTINOTHERAPY UV LIGHT: CPT

## 2025-05-16 PROCEDURE — ? PHOTOTHERAPY TREATMENT

## 2025-05-16 PROCEDURE — ? COUNSELING

## 2025-05-16 ASSESSMENT — LOCATION ZONE DERM
LOCATION ZONE: TRUNK
LOCATION ZONE: LEG

## 2025-05-16 ASSESSMENT — LOCATION SIMPLE DESCRIPTION DERM
LOCATION SIMPLE: LEFT PRETIBIAL REGION
LOCATION SIMPLE: LEFT BUTTOCK
LOCATION SIMPLE: RIGHT THIGH
LOCATION SIMPLE: RIGHT PRETIBIAL REGION
LOCATION SIMPLE: LEFT THIGH

## 2025-05-16 ASSESSMENT — LOCATION DETAILED DESCRIPTION DERM
LOCATION DETAILED: RIGHT PROXIMAL PRETIBIAL REGION
LOCATION DETAILED: LEFT BUTTOCK
LOCATION DETAILED: RIGHT ANTERIOR DISTAL THIGH
LOCATION DETAILED: LEFT ANTERIOR PROXIMAL THIGH
LOCATION DETAILED: LEFT PROXIMAL PRETIBIAL REGION

## 2025-05-16 NOTE — PROCEDURE: PHOTOTHERAPY TREATMENT
Name Of Supervising Technician: LING
Protocol For Protocol For Photochemotherapy For Severe Photoresponsive Dermatoses: Bath Puva: The patient received Photochemotherapy for severe photoresponsive dermatoses: Bath PUVA requiring at least 4 to 8 hours of care under direct physician supervision.
Protocol For Photochemotherapy For Severe Photoresponsive Dermatoses: Petrolatum And Nbuvb: The patient received Photochemotherapy for severe photoresponsive dermatoses: Petrolatum and NBUVB requiring at least 4 to 8 hours of care under direct physician supervision.
Protocol For Broad Band Uvb: The patient received Broad Band UVB.
Protocol For Photochemotherapy For Severe Photoresponsive Dermatoses: Tar And Nbuvb (Goeckerman Treatment): The patient received Photochemotherapy for severe photoresponsive dermatoses: Tar and NBUVB (Goeckerman treatment) requiring at least 4 to 8 hours of care under direct physician supervision.
Treatment Number: 5
Protocol For Photochemotherapy For Severe Photoresponsive Dermatoses: Tar And Broad Band Uvb (Goeckerman Treatment): The patient received Photochemotherapy for severe photoresponsive dermatoses: Tar and Broad Band UVB (Goeckerman treatment) requiring at least 4 to 8 hours of care under direct physician supervision.
Protocol For Photochemotherapy: Petrolatum And Nbuvb: The patient received Photochemotherapy: Petrolatum and NBUVB (petrolatum applied to all lesions prior to phototherapy).
Post-Care Instructions: I reviewed with the patient in detail post-care instructions. Patient is to wear sun protection. Patients may expect sunburn like redness, discomfort and scabbing.
Protocol For Nb Uva: The patient received NB UVA.
Protocol For Photochemotherapy: Baby Oil And Nbuvb: The patient received Photochemotherapy: Baby Oil and NBUVB (baby oil applied to all lesions prior to phototherapy).
Protocol For Photochemotherapy: Tar And Broad Band Uvb (Goeckerman Treatment): The patient received Photochemotherapy: Tar and Broad Band UVB (Goeckerman treatment).
Protocol For Nbuvb: The patient received NBUVB.
Consent: Written consent obtained.  The risks were reviewed with the patient including but not limited to: burn, pigmentary changes, pain, blistering, scabbing, redness, increased risk of skin cancers, and the remote possibility of scarring.
Protocol For Photochemotherapy For Severe Photoresponsive Dermatoses: Petrolatum And Broad Band Uvb: The patient received Photochemotherapyfor severe photoresponsive dermatoses: Petrolatum and Broad Band UVB requiring at least 4 to 8 hours of care under direct physician supervision.
Total Body Energy: 110
Total Body Time: 0:21
Protocol For Photochemotherapy For Severe Photoresponsive Dermatoses: Puva: The patient received Photochemotherapy for severe photoresponsive dermatoses: PUVA requiring at least 4 to 8 hours of care under direct physician supervision.
Protocol: NBUVB
Protocol For Puva: The patient received PUVA.
Protocol For Photochemotherapy: Petrolatum And Broad Band Uvb: The patient received Photochemotherapy: Petrolatum and Broad Band UVB.
Protocol For Photochemotherapy: Mineral Oil And Nbuvb: The patient received Photochemotherapy: Mineral Oil and NBUVB (mineral oil applied to all lesions prior to phototherapy).
Render Post-Care In The Note: no
Protocol For Photochemotherapy: Tar And Nbuvb (Goeckerman Treatment): The patient received Photochemotherapy: Tar and NBUVB (Goeckerman treatment).
Protocol For Uva1: The patient received UVA1.
Protocol For Bath Puva: The patient received Bath PUVA.
Protocol For Photochemotherapy: Mineral Oil And Broad Band Uvb: The patient received Photochemotherapy: Mineral Oil and Broad Band UVB.
Protocol For Uva: The patient received UVA.
Detail Level: Zone
Irradiance (Optional- Include Units): 5.2
Protocol For Photochemotherapy: Triamcinolone Ointment And Nbuvb: The patient received Photochemotherapy: Triamcinolone and NBUVB (triamcinolone ointment applied to all lesions prior to phototherapy).

## 2025-05-19 ENCOUNTER — APPOINTMENT (OUTPATIENT)
Dept: URBAN - METROPOLITAN AREA CLINIC 236 | Facility: CLINIC | Age: 75
Setting detail: DERMATOLOGY
End: 2025-05-19

## 2025-05-19 DIAGNOSIS — C84.0 MYCOSIS FUNGOIDES: ICD-10-CM

## 2025-05-19 PROBLEM — C84.00 MYCOSIS FUNGOIDES, UNSPECIFIED SITE: Status: ACTIVE | Noted: 2025-05-19

## 2025-05-19 PROCEDURE — ? COUNSELING

## 2025-05-19 PROCEDURE — ? PHOTOTHERAPY TREATMENT

## 2025-05-19 PROCEDURE — 96900 ACTINOTHERAPY UV LIGHT: CPT

## 2025-05-19 ASSESSMENT — LOCATION DETAILED DESCRIPTION DERM
LOCATION DETAILED: LEFT BUTTOCK
LOCATION DETAILED: RIGHT ANTERIOR DISTAL THIGH
LOCATION DETAILED: LEFT PROXIMAL PRETIBIAL REGION
LOCATION DETAILED: LEFT ANTERIOR PROXIMAL THIGH
LOCATION DETAILED: RIGHT PROXIMAL PRETIBIAL REGION

## 2025-05-19 ASSESSMENT — LOCATION ZONE DERM
LOCATION ZONE: LEG
LOCATION ZONE: TRUNK

## 2025-05-19 ASSESSMENT — LOCATION SIMPLE DESCRIPTION DERM
LOCATION SIMPLE: LEFT BUTTOCK
LOCATION SIMPLE: LEFT PRETIBIAL REGION
LOCATION SIMPLE: LEFT THIGH
LOCATION SIMPLE: RIGHT PRETIBIAL REGION
LOCATION SIMPLE: RIGHT THIGH

## 2025-05-19 NOTE — PROCEDURE: PHOTOTHERAPY TREATMENT
Name Of Supervising Technician: MB
Protocol For Protocol For Photochemotherapy For Severe Photoresponsive Dermatoses: Bath Puva: The patient received Photochemotherapy for severe photoresponsive dermatoses: Bath PUVA requiring at least 4 to 8 hours of care under direct physician supervision.
Protocol For Photochemotherapy For Severe Photoresponsive Dermatoses: Petrolatum And Nbuvb: The patient received Photochemotherapy for severe photoresponsive dermatoses: Petrolatum and NBUVB requiring at least 4 to 8 hours of care under direct physician supervision.
Protocol For Broad Band Uvb: The patient received Broad Band UVB.
Protocol For Photochemotherapy For Severe Photoresponsive Dermatoses: Tar And Nbuvb (Goeckerman Treatment): The patient received Photochemotherapy for severe photoresponsive dermatoses: Tar and NBUVB (Goeckerman treatment) requiring at least 4 to 8 hours of care under direct physician supervision.
Treatment Number: 6
Protocol For Photochemotherapy For Severe Photoresponsive Dermatoses: Tar And Broad Band Uvb (Goeckerman Treatment): The patient received Photochemotherapy for severe photoresponsive dermatoses: Tar and Broad Band UVB (Goeckerman treatment) requiring at least 4 to 8 hours of care under direct physician supervision.
Protocol For Photochemotherapy: Petrolatum And Nbuvb: The patient received Photochemotherapy: Petrolatum and NBUVB (petrolatum applied to all lesions prior to phototherapy).
Post-Care Instructions: I reviewed with the patient in detail post-care instructions. Patient is to wear sun protection. Patients may expect sunburn like redness, discomfort and scabbing.
Protocol For Nb Uva: The patient received NB UVA.
Protocol For Photochemotherapy: Baby Oil And Nbuvb: The patient received Photochemotherapy: Baby Oil and NBUVB (baby oil applied to all lesions prior to phototherapy).
Protocol For Photochemotherapy: Tar And Broad Band Uvb (Goeckerman Treatment): The patient received Photochemotherapy: Tar and Broad Band UVB (Goeckerman treatment).
Protocol For Nbuvb: The patient received NBUVB.
Consent: Written consent obtained.  The risks were reviewed with the patient including but not limited to: burn, pigmentary changes, pain, blistering, scabbing, redness, increased risk of skin cancers, and the remote possibility of scarring.
Protocol For Photochemotherapy For Severe Photoresponsive Dermatoses: Petrolatum And Broad Band Uvb: The patient received Photochemotherapyfor severe photoresponsive dermatoses: Petrolatum and Broad Band UVB requiring at least 4 to 8 hours of care under direct physician supervision.
Total Body Energy: 130
Total Body Time: 0:25
Protocol For Photochemotherapy For Severe Photoresponsive Dermatoses: Puva: The patient received Photochemotherapy for severe photoresponsive dermatoses: PUVA requiring at least 4 to 8 hours of care under direct physician supervision.
Protocol: NBUVB
Protocol For Puva: The patient received PUVA.
Protocol For Photochemotherapy: Petrolatum And Broad Band Uvb: The patient received Photochemotherapy: Petrolatum and Broad Band UVB.
Protocol For Photochemotherapy: Mineral Oil And Nbuvb: The patient received Photochemotherapy: Mineral Oil and NBUVB (mineral oil applied to all lesions prior to phototherapy).
Render Post-Care In The Note: no
Protocol For Photochemotherapy: Tar And Nbuvb (Goeckerman Treatment): The patient received Photochemotherapy: Tar and NBUVB (Goeckerman treatment).
Protocol For Uva1: The patient received UVA1.
Protocol For Bath Puva: The patient received Bath PUVA.
Protocol For Photochemotherapy: Mineral Oil And Broad Band Uvb: The patient received Photochemotherapy: Mineral Oil and Broad Band UVB.
Protocol For Uva: The patient received UVA.
Detail Level: Zone
Irradiance (Optional- Include Units): 5.2
Protocol For Photochemotherapy: Triamcinolone Ointment And Nbuvb: The patient received Photochemotherapy: Triamcinolone and NBUVB (triamcinolone ointment applied to all lesions prior to phototherapy).

## 2025-05-21 ENCOUNTER — APPOINTMENT (OUTPATIENT)
Dept: URBAN - METROPOLITAN AREA CLINIC 236 | Facility: CLINIC | Age: 75
Setting detail: DERMATOLOGY
End: 2025-05-21

## 2025-05-21 DIAGNOSIS — C84.0 MYCOSIS FUNGOIDES: ICD-10-CM

## 2025-05-21 PROBLEM — C84.00 MYCOSIS FUNGOIDES, UNSPECIFIED SITE: Status: ACTIVE | Noted: 2025-05-21

## 2025-05-21 PROCEDURE — ? COUNSELING

## 2025-05-21 PROCEDURE — 96900 ACTINOTHERAPY UV LIGHT: CPT

## 2025-05-21 PROCEDURE — ? PHOTOTHERAPY TREATMENT

## 2025-05-21 ASSESSMENT — LOCATION DETAILED DESCRIPTION DERM
LOCATION DETAILED: RIGHT ANTERIOR DISTAL THIGH
LOCATION DETAILED: LEFT PROXIMAL PRETIBIAL REGION
LOCATION DETAILED: RIGHT PROXIMAL PRETIBIAL REGION
LOCATION DETAILED: LEFT BUTTOCK
LOCATION DETAILED: LEFT ANTERIOR PROXIMAL THIGH

## 2025-05-21 ASSESSMENT — LOCATION SIMPLE DESCRIPTION DERM
LOCATION SIMPLE: RIGHT THIGH
LOCATION SIMPLE: LEFT PRETIBIAL REGION
LOCATION SIMPLE: RIGHT PRETIBIAL REGION
LOCATION SIMPLE: LEFT BUTTOCK
LOCATION SIMPLE: LEFT THIGH

## 2025-05-21 ASSESSMENT — LOCATION ZONE DERM
LOCATION ZONE: LEG
LOCATION ZONE: TRUNK

## 2025-05-21 NOTE — PROCEDURE: PHOTOTHERAPY TREATMENT
Name Of Supervising Technician: MB
Protocol For Protocol For Photochemotherapy For Severe Photoresponsive Dermatoses: Bath Puva: The patient received Photochemotherapy for severe photoresponsive dermatoses: Bath PUVA requiring at least 4 to 8 hours of care under direct physician supervision.
Protocol For Photochemotherapy For Severe Photoresponsive Dermatoses: Petrolatum And Nbuvb: The patient received Photochemotherapy for severe photoresponsive dermatoses: Petrolatum and NBUVB requiring at least 4 to 8 hours of care under direct physician supervision.
Protocol For Broad Band Uvb: The patient received Broad Band UVB.
Protocol For Photochemotherapy For Severe Photoresponsive Dermatoses: Tar And Nbuvb (Goeckerman Treatment): The patient received Photochemotherapy for severe photoresponsive dermatoses: Tar and NBUVB (Goeckerman treatment) requiring at least 4 to 8 hours of care under direct physician supervision.
Treatment Number: 6
Protocol For Photochemotherapy For Severe Photoresponsive Dermatoses: Tar And Broad Band Uvb (Goeckerman Treatment): The patient received Photochemotherapy for severe photoresponsive dermatoses: Tar and Broad Band UVB (Goeckerman treatment) requiring at least 4 to 8 hours of care under direct physician supervision.
Protocol For Photochemotherapy: Petrolatum And Nbuvb: The patient received Photochemotherapy: Petrolatum and NBUVB (petrolatum applied to all lesions prior to phototherapy).
Post-Care Instructions: I reviewed with the patient in detail post-care instructions. Patient is to wear sun protection. Patients may expect sunburn like redness, discomfort and scabbing.
Protocol For Nb Uva: The patient received NB UVA.
Protocol For Photochemotherapy: Baby Oil And Nbuvb: The patient received Photochemotherapy: Baby Oil and NBUVB (baby oil applied to all lesions prior to phototherapy).
Protocol For Photochemotherapy: Tar And Broad Band Uvb (Goeckerman Treatment): The patient received Photochemotherapy: Tar and Broad Band UVB (Goeckerman treatment).
Protocol For Nbuvb: The patient received NBUVB.
Consent: Written consent obtained.  The risks were reviewed with the patient including but not limited to: burn, pigmentary changes, pain, blistering, scabbing, redness, increased risk of skin cancers, and the remote possibility of scarring.
Protocol For Photochemotherapy For Severe Photoresponsive Dermatoses: Petrolatum And Broad Band Uvb: The patient received Photochemotherapyfor severe photoresponsive dermatoses: Petrolatum and Broad Band UVB requiring at least 4 to 8 hours of care under direct physician supervision.
Total Body Energy: 150
Total Body Time: 0:29
Protocol For Photochemotherapy For Severe Photoresponsive Dermatoses: Puva: The patient received Photochemotherapy for severe photoresponsive dermatoses: PUVA requiring at least 4 to 8 hours of care under direct physician supervision.
Protocol: NBUVB
Protocol For Puva: The patient received PUVA.
Protocol For Photochemotherapy: Petrolatum And Broad Band Uvb: The patient received Photochemotherapy: Petrolatum and Broad Band UVB.
Protocol For Photochemotherapy: Mineral Oil And Nbuvb: The patient received Photochemotherapy: Mineral Oil and NBUVB (mineral oil applied to all lesions prior to phototherapy).
Render Post-Care In The Note: no
Protocol For Photochemotherapy: Tar And Nbuvb (Goeckerman Treatment): The patient received Photochemotherapy: Tar and NBUVB (Goeckerman treatment).
Protocol For Uva1: The patient received UVA1.
Protocol For Bath Puva: The patient received Bath PUVA.
Protocol For Photochemotherapy: Mineral Oil And Broad Band Uvb: The patient received Photochemotherapy: Mineral Oil and Broad Band UVB.
Protocol For Uva: The patient received UVA.
Detail Level: Zone
Irradiance (Optional- Include Units): 5.2
Protocol For Photochemotherapy: Triamcinolone Ointment And Nbuvb: The patient received Photochemotherapy: Triamcinolone and NBUVB (triamcinolone ointment applied to all lesions prior to phototherapy).

## 2025-05-23 ENCOUNTER — APPOINTMENT (OUTPATIENT)
Dept: URBAN - METROPOLITAN AREA CLINIC 203 | Facility: CLINIC | Age: 75
Setting detail: DERMATOLOGY
End: 2025-05-23

## 2025-05-23 DIAGNOSIS — C84.0 MYCOSIS FUNGOIDES: ICD-10-CM

## 2025-05-23 PROBLEM — C84.00 MYCOSIS FUNGOIDES, UNSPECIFIED SITE: Status: ACTIVE | Noted: 2025-05-23

## 2025-05-23 PROCEDURE — ? COUNSELING

## 2025-05-23 PROCEDURE — 96900 ACTINOTHERAPY UV LIGHT: CPT

## 2025-05-23 PROCEDURE — ? PHOTOTHERAPY TREATMENT

## 2025-05-23 ASSESSMENT — LOCATION ZONE DERM
LOCATION ZONE: TRUNK
LOCATION ZONE: LEG

## 2025-05-23 ASSESSMENT — LOCATION SIMPLE DESCRIPTION DERM
LOCATION SIMPLE: LEFT THIGH
LOCATION SIMPLE: RIGHT THIGH
LOCATION SIMPLE: RIGHT PRETIBIAL REGION
LOCATION SIMPLE: LEFT PRETIBIAL REGION
LOCATION SIMPLE: LEFT BUTTOCK

## 2025-05-23 ASSESSMENT — LOCATION DETAILED DESCRIPTION DERM
LOCATION DETAILED: RIGHT ANTERIOR DISTAL THIGH
LOCATION DETAILED: RIGHT PROXIMAL PRETIBIAL REGION
LOCATION DETAILED: LEFT ANTERIOR PROXIMAL THIGH
LOCATION DETAILED: LEFT BUTTOCK
LOCATION DETAILED: LEFT PROXIMAL PRETIBIAL REGION

## 2025-05-23 NOTE — PROCEDURE: PHOTOTHERAPY TREATMENT
Name Of Supervising Technician: Mary Thomas
Protocol For Protocol For Photochemotherapy For Severe Photoresponsive Dermatoses: Bath Puva: The patient received Photochemotherapy for severe photoresponsive dermatoses: Bath PUVA requiring at least 4 to 8 hours of care under direct physician supervision.
Protocol For Photochemotherapy For Severe Photoresponsive Dermatoses: Petrolatum And Nbuvb: The patient received Photochemotherapy for severe photoresponsive dermatoses: Petrolatum and NBUVB requiring at least 4 to 8 hours of care under direct physician supervision.
Protocol For Broad Band Uvb: The patient received Broad Band UVB.
Protocol For Photochemotherapy For Severe Photoresponsive Dermatoses: Tar And Nbuvb (Goeckerman Treatment): The patient received Photochemotherapy for severe photoresponsive dermatoses: Tar and NBUVB (Goeckerman treatment) requiring at least 4 to 8 hours of care under direct physician supervision.
Treatment Number: 7
Protocol For Photochemotherapy For Severe Photoresponsive Dermatoses: Tar And Broad Band Uvb (Goeckerman Treatment): The patient received Photochemotherapy for severe photoresponsive dermatoses: Tar and Broad Band UVB (Goeckerman treatment) requiring at least 4 to 8 hours of care under direct physician supervision.
Protocol For Photochemotherapy: Petrolatum And Nbuvb: The patient received Photochemotherapy: Petrolatum and NBUVB (petrolatum applied to all lesions prior to phototherapy).
Post-Care Instructions: I reviewed with the patient in detail post-care instructions. Patient is to wear sun protection. Patients may expect sunburn like redness, discomfort and scabbing.
Protocol For Nb Uva: The patient received NB UVA.
Protocol For Photochemotherapy: Baby Oil And Nbuvb: The patient received Photochemotherapy: Baby Oil and NBUVB (baby oil applied to all lesions prior to phototherapy).
Protocol For Photochemotherapy: Tar And Broad Band Uvb (Goeckerman Treatment): The patient received Photochemotherapy: Tar and Broad Band UVB (Goeckerman treatment).
Protocol For Nbuvb: The patient received NBUVB.
Consent: Written consent obtained.  The risks were reviewed with the patient including but not limited to: burn, pigmentary changes, pain, blistering, scabbing, redness, increased risk of skin cancers, and the remote possibility of scarring.
Protocol For Photochemotherapy For Severe Photoresponsive Dermatoses: Petrolatum And Broad Band Uvb: The patient received Photochemotherapyfor severe photoresponsive dermatoses: Petrolatum and Broad Band UVB requiring at least 4 to 8 hours of care under direct physician supervision.
Total Body Energy: 170
Total Body Time: 0:33
Protocol For Photochemotherapy For Severe Photoresponsive Dermatoses: Puva: The patient received Photochemotherapy for severe photoresponsive dermatoses: PUVA requiring at least 4 to 8 hours of care under direct physician supervision.
Protocol: NBUVB
Protocol For Puva: The patient received PUVA.
Protocol For Photochemotherapy: Petrolatum And Broad Band Uvb: The patient received Photochemotherapy: Petrolatum and Broad Band UVB.
Protocol For Photochemotherapy: Mineral Oil And Nbuvb: The patient received Photochemotherapy: Mineral Oil and NBUVB (mineral oil applied to all lesions prior to phototherapy).
Render Post-Care In The Note: no
Protocol For Photochemotherapy: Tar And Nbuvb (Goeckerman Treatment): The patient received Photochemotherapy: Tar and NBUVB (Goeckerman treatment).
Protocol For Uva1: The patient received UVA1.
Protocol For Bath Puva: The patient received Bath PUVA.
Protocol For Photochemotherapy: Mineral Oil And Broad Band Uvb: The patient received Photochemotherapy: Mineral Oil and Broad Band UVB.
Protocol For Uva: The patient received UVA.
Detail Level: Zone
Irradiance (Optional- Include Units): 5.2
Protocol For Photochemotherapy: Triamcinolone Ointment And Nbuvb: The patient received Photochemotherapy: Triamcinolone and NBUVB (triamcinolone ointment applied to all lesions prior to phototherapy).

## 2025-06-02 ENCOUNTER — APPOINTMENT (OUTPATIENT)
Dept: URBAN - METROPOLITAN AREA CLINIC 236 | Facility: CLINIC | Age: 75
Setting detail: DERMATOLOGY
End: 2025-06-02

## 2025-06-02 DIAGNOSIS — C84.0 MYCOSIS FUNGOIDES: ICD-10-CM

## 2025-06-02 PROBLEM — C84.00 MYCOSIS FUNGOIDES, UNSPECIFIED SITE: Status: ACTIVE | Noted: 2025-06-02

## 2025-06-02 PROCEDURE — ? PHOTOTHERAPY TREATMENT

## 2025-06-02 PROCEDURE — 96900 ACTINOTHERAPY UV LIGHT: CPT

## 2025-06-02 PROCEDURE — ? COUNSELING

## 2025-06-02 ASSESSMENT — LOCATION DETAILED DESCRIPTION DERM
LOCATION DETAILED: RIGHT PROXIMAL PRETIBIAL REGION
LOCATION DETAILED: LEFT BUTTOCK
LOCATION DETAILED: LEFT ANTERIOR PROXIMAL THIGH
LOCATION DETAILED: RIGHT ANTERIOR DISTAL THIGH
LOCATION DETAILED: LEFT PROXIMAL PRETIBIAL REGION

## 2025-06-02 ASSESSMENT — LOCATION SIMPLE DESCRIPTION DERM
LOCATION SIMPLE: LEFT THIGH
LOCATION SIMPLE: LEFT BUTTOCK
LOCATION SIMPLE: RIGHT THIGH
LOCATION SIMPLE: RIGHT PRETIBIAL REGION
LOCATION SIMPLE: LEFT PRETIBIAL REGION

## 2025-06-02 ASSESSMENT — LOCATION ZONE DERM
LOCATION ZONE: TRUNK
LOCATION ZONE: LEG

## 2025-06-02 NOTE — PROCEDURE: PHOTOTHERAPY TREATMENT
Name Of Supervising Technician: DOMINGO
Protocol For Protocol For Photochemotherapy For Severe Photoresponsive Dermatoses: Bath Puva: The patient received Photochemotherapy for severe photoresponsive dermatoses: Bath PUVA requiring at least 4 to 8 hours of care under direct physician supervision.
Protocol For Photochemotherapy For Severe Photoresponsive Dermatoses: Petrolatum And Nbuvb: The patient received Photochemotherapy for severe photoresponsive dermatoses: Petrolatum and NBUVB requiring at least 4 to 8 hours of care under direct physician supervision.
Protocol For Broad Band Uvb: The patient received Broad Band UVB.
Protocol For Photochemotherapy For Severe Photoresponsive Dermatoses: Tar And Nbuvb (Goeckerman Treatment): The patient received Photochemotherapy for severe photoresponsive dermatoses: Tar and NBUVB (Goeckerman treatment) requiring at least 4 to 8 hours of care under direct physician supervision.
Treatment Number: 7
Protocol For Photochemotherapy For Severe Photoresponsive Dermatoses: Tar And Broad Band Uvb (Goeckerman Treatment): The patient received Photochemotherapy for severe photoresponsive dermatoses: Tar and Broad Band UVB (Goeckerman treatment) requiring at least 4 to 8 hours of care under direct physician supervision.
Protocol For Photochemotherapy: Petrolatum And Nbuvb: The patient received Photochemotherapy: Petrolatum and NBUVB (petrolatum applied to all lesions prior to phototherapy).
Post-Care Instructions: I reviewed with the patient in detail post-care instructions. Patient is to wear sun protection. Patients may expect sunburn like redness, discomfort and scabbing.
Protocol For Nb Uva: The patient received NB UVA.
Protocol For Photochemotherapy: Baby Oil And Nbuvb: The patient received Photochemotherapy: Baby Oil and NBUVB (baby oil applied to all lesions prior to phototherapy).
Protocol For Photochemotherapy: Tar And Broad Band Uvb (Goeckerman Treatment): The patient received Photochemotherapy: Tar and Broad Band UVB (Goeckerman treatment).
Protocol For Nbuvb: The patient received NBUVB.
Consent: Written consent obtained.  The risks were reviewed with the patient including but not limited to: burn, pigmentary changes, pain, blistering, scabbing, redness, increased risk of skin cancers, and the remote possibility of scarring.
Protocol For Photochemotherapy For Severe Photoresponsive Dermatoses: Petrolatum And Broad Band Uvb: The patient received Photochemotherapyfor severe photoresponsive dermatoses: Petrolatum and Broad Band UVB requiring at least 4 to 8 hours of care under direct physician supervision.
Total Body Energy: 190
Total Body Time: 0:37
Protocol For Photochemotherapy For Severe Photoresponsive Dermatoses: Puva: The patient received Photochemotherapy for severe photoresponsive dermatoses: PUVA requiring at least 4 to 8 hours of care under direct physician supervision.
Protocol: NBUVB
Protocol For Puva: The patient received PUVA.
Protocol For Photochemotherapy: Petrolatum And Broad Band Uvb: The patient received Photochemotherapy: Petrolatum and Broad Band UVB.
Protocol For Photochemotherapy: Mineral Oil And Nbuvb: The patient received Photochemotherapy: Mineral Oil and NBUVB (mineral oil applied to all lesions prior to phototherapy).
Render Post-Care In The Note: no
Protocol For Photochemotherapy: Tar And Nbuvb (Goeckerman Treatment): The patient received Photochemotherapy: Tar and NBUVB (Goeckerman treatment).
Protocol For Uva1: The patient received UVA1.
Protocol For Bath Puva: The patient received Bath PUVA.
Protocol For Photochemotherapy: Mineral Oil And Broad Band Uvb: The patient received Photochemotherapy: Mineral Oil and Broad Band UVB.
Protocol For Uva: The patient received UVA.
Detail Level: Zone
Irradiance (Optional- Include Units): 5.2
Protocol For Photochemotherapy: Triamcinolone Ointment And Nbuvb: The patient received Photochemotherapy: Triamcinolone and NBUVB (triamcinolone ointment applied to all lesions prior to phototherapy).

## 2025-06-04 ENCOUNTER — APPOINTMENT (OUTPATIENT)
Dept: URBAN - METROPOLITAN AREA CLINIC 236 | Facility: CLINIC | Age: 75
Setting detail: DERMATOLOGY
End: 2025-06-04

## 2025-06-04 DIAGNOSIS — C84.0 MYCOSIS FUNGOIDES: ICD-10-CM

## 2025-06-04 PROBLEM — C84.00 MYCOSIS FUNGOIDES, UNSPECIFIED SITE: Status: ACTIVE | Noted: 2025-06-04

## 2025-06-04 PROCEDURE — ? PHOTOTHERAPY TREATMENT

## 2025-06-04 PROCEDURE — ? COUNSELING

## 2025-06-04 ASSESSMENT — LOCATION DETAILED DESCRIPTION DERM
LOCATION DETAILED: RIGHT PROXIMAL PRETIBIAL REGION
LOCATION DETAILED: LEFT BUTTOCK
LOCATION DETAILED: RIGHT ANTERIOR DISTAL THIGH
LOCATION DETAILED: LEFT PROXIMAL PRETIBIAL REGION
LOCATION DETAILED: LEFT ANTERIOR PROXIMAL THIGH

## 2025-06-04 ASSESSMENT — LOCATION ZONE DERM
LOCATION ZONE: TRUNK
LOCATION ZONE: LEG

## 2025-06-04 ASSESSMENT — LOCATION SIMPLE DESCRIPTION DERM
LOCATION SIMPLE: LEFT BUTTOCK
LOCATION SIMPLE: RIGHT PRETIBIAL REGION
LOCATION SIMPLE: LEFT THIGH
LOCATION SIMPLE: RIGHT THIGH
LOCATION SIMPLE: LEFT PRETIBIAL REGION

## 2025-06-04 NOTE — PROCEDURE: PHOTOTHERAPY TREATMENT
Name Of Supervising Technician: LING
Protocol For Protocol For Photochemotherapy For Severe Photoresponsive Dermatoses: Bath Puva: The patient received Photochemotherapy for severe photoresponsive dermatoses: Bath PUVA requiring at least 4 to 8 hours of care under direct physician supervision.
Protocol For Photochemotherapy For Severe Photoresponsive Dermatoses: Petrolatum And Nbuvb: The patient received Photochemotherapy for severe photoresponsive dermatoses: Petrolatum and NBUVB requiring at least 4 to 8 hours of care under direct physician supervision.
Protocol For Broad Band Uvb: The patient received Broad Band UVB.
Protocol For Photochemotherapy For Severe Photoresponsive Dermatoses: Tar And Nbuvb (Goeckerman Treatment): The patient received Photochemotherapy for severe photoresponsive dermatoses: Tar and NBUVB (Goeckerman treatment) requiring at least 4 to 8 hours of care under direct physician supervision.
Treatment Number: 8
Protocol For Photochemotherapy For Severe Photoresponsive Dermatoses: Tar And Broad Band Uvb (Goeckerman Treatment): The patient received Photochemotherapy for severe photoresponsive dermatoses: Tar and Broad Band UVB (Goeckerman treatment) requiring at least 4 to 8 hours of care under direct physician supervision.
Protocol For Photochemotherapy: Petrolatum And Nbuvb: The patient received Photochemotherapy: Petrolatum and NBUVB (petrolatum applied to all lesions prior to phototherapy).
Post-Care Instructions: I reviewed with the patient in detail post-care instructions. Patient is to wear sun protection. Patients may expect sunburn like redness, discomfort and scabbing.
Protocol For Nb Uva: The patient received NB UVA.
Protocol For Photochemotherapy: Baby Oil And Nbuvb: The patient received Photochemotherapy: Baby Oil and NBUVB (baby oil applied to all lesions prior to phototherapy).
Protocol For Photochemotherapy: Tar And Broad Band Uvb (Goeckerman Treatment): The patient received Photochemotherapy: Tar and Broad Band UVB (Goeckerman treatment).
Protocol For Nbuvb: The patient received NBUVB.
Consent: Written consent obtained.  The risks were reviewed with the patient including but not limited to: burn, pigmentary changes, pain, blistering, scabbing, redness, increased risk of skin cancers, and the remote possibility of scarring.
Protocol For Photochemotherapy For Severe Photoresponsive Dermatoses: Petrolatum And Broad Band Uvb: The patient received Photochemotherapyfor severe photoresponsive dermatoses: Petrolatum and Broad Band UVB requiring at least 4 to 8 hours of care under direct physician supervision.
Total Body Energy: 210
Total Body Time: 0:41
Protocol For Photochemotherapy For Severe Photoresponsive Dermatoses: Puva: The patient received Photochemotherapy for severe photoresponsive dermatoses: PUVA requiring at least 4 to 8 hours of care under direct physician supervision.
Protocol: NBUVB
Protocol For Puva: The patient received PUVA.
Protocol For Photochemotherapy: Petrolatum And Broad Band Uvb: The patient received Photochemotherapy: Petrolatum and Broad Band UVB.
Protocol For Photochemotherapy: Mineral Oil And Nbuvb: The patient received Photochemotherapy: Mineral Oil and NBUVB (mineral oil applied to all lesions prior to phototherapy).
Render Post-Care In The Note: no
Protocol For Photochemotherapy: Tar And Nbuvb (Goeckerman Treatment): The patient received Photochemotherapy: Tar and NBUVB (Goeckerman treatment).
Protocol For Uva1: The patient received UVA1.
Protocol For Bath Puva: The patient received Bath PUVA.
Protocol For Photochemotherapy: Mineral Oil And Broad Band Uvb: The patient received Photochemotherapy: Mineral Oil and Broad Band UVB.
Protocol For Uva: The patient received UVA.
Detail Level: Zone
Irradiance (Optional- Include Units): 5.2
Protocol For Photochemotherapy: Triamcinolone Ointment And Nbuvb: The patient received Photochemotherapy: Triamcinolone and NBUVB (triamcinolone ointment applied to all lesions prior to phototherapy).

## 2025-06-06 ENCOUNTER — APPOINTMENT (OUTPATIENT)
Dept: URBAN - METROPOLITAN AREA CLINIC 236 | Facility: CLINIC | Age: 75
Setting detail: DERMATOLOGY
End: 2025-06-06

## 2025-06-06 DIAGNOSIS — C84.0 MYCOSIS FUNGOIDES: ICD-10-CM

## 2025-06-06 PROBLEM — C84.00 MYCOSIS FUNGOIDES, UNSPECIFIED SITE: Status: ACTIVE | Noted: 2025-06-06

## 2025-06-06 PROCEDURE — ? PHOTOTHERAPY TREATMENT

## 2025-06-06 PROCEDURE — ? COUNSELING

## 2025-06-06 ASSESSMENT — LOCATION SIMPLE DESCRIPTION DERM
LOCATION SIMPLE: LEFT PRETIBIAL REGION
LOCATION SIMPLE: RIGHT PRETIBIAL REGION
LOCATION SIMPLE: LEFT THIGH
LOCATION SIMPLE: RIGHT THIGH
LOCATION SIMPLE: LEFT BUTTOCK

## 2025-06-06 ASSESSMENT — LOCATION DETAILED DESCRIPTION DERM
LOCATION DETAILED: RIGHT ANTERIOR DISTAL THIGH
LOCATION DETAILED: LEFT PROXIMAL PRETIBIAL REGION
LOCATION DETAILED: LEFT ANTERIOR PROXIMAL THIGH
LOCATION DETAILED: RIGHT PROXIMAL PRETIBIAL REGION
LOCATION DETAILED: LEFT BUTTOCK

## 2025-06-06 ASSESSMENT — LOCATION ZONE DERM
LOCATION ZONE: TRUNK
LOCATION ZONE: LEG

## 2025-06-06 NOTE — PROCEDURE: PHOTOTHERAPY TREATMENT
Name Of Supervising Technician: DANE
Protocol For Protocol For Photochemotherapy For Severe Photoresponsive Dermatoses: Bath Puva: The patient received Photochemotherapy for severe photoresponsive dermatoses: Bath PUVA requiring at least 4 to 8 hours of care under direct physician supervision.
Protocol For Photochemotherapy For Severe Photoresponsive Dermatoses: Petrolatum And Nbuvb: The patient received Photochemotherapy for severe photoresponsive dermatoses: Petrolatum and NBUVB requiring at least 4 to 8 hours of care under direct physician supervision.
Protocol For Broad Band Uvb: The patient received Broad Band UVB.
Protocol For Photochemotherapy For Severe Photoresponsive Dermatoses: Tar And Nbuvb (Goeckerman Treatment): The patient received Photochemotherapy for severe photoresponsive dermatoses: Tar and NBUVB (Goeckerman treatment) requiring at least 4 to 8 hours of care under direct physician supervision.
Treatment Number: 9
Protocol For Photochemotherapy For Severe Photoresponsive Dermatoses: Tar And Broad Band Uvb (Goeckerman Treatment): The patient received Photochemotherapy for severe photoresponsive dermatoses: Tar and Broad Band UVB (Goeckerman treatment) requiring at least 4 to 8 hours of care under direct physician supervision.
Protocol For Photochemotherapy: Petrolatum And Nbuvb: The patient received Photochemotherapy: Petrolatum and NBUVB (petrolatum applied to all lesions prior to phototherapy).
Post-Care Instructions: I reviewed with the patient in detail post-care instructions. Patient is to wear sun protection. Patients may expect sunburn like redness, discomfort and scabbing.
Protocol For Nb Uva: The patient received NB UVA.
Protocol For Photochemotherapy: Baby Oil And Nbuvb: The patient received Photochemotherapy: Baby Oil and NBUVB (baby oil applied to all lesions prior to phototherapy).
Protocol For Photochemotherapy: Tar And Broad Band Uvb (Goeckerman Treatment): The patient received Photochemotherapy: Tar and Broad Band UVB (Goeckerman treatment).
Protocol For Nbuvb: The patient received NBUVB.
Consent: Written consent obtained.  The risks were reviewed with the patient including but not limited to: burn, pigmentary changes, pain, blistering, scabbing, redness, increased risk of skin cancers, and the remote possibility of scarring.
Protocol For Photochemotherapy For Severe Photoresponsive Dermatoses: Petrolatum And Broad Band Uvb: The patient received Photochemotherapyfor severe photoresponsive dermatoses: Petrolatum and Broad Band UVB requiring at least 4 to 8 hours of care under direct physician supervision.
Total Body Energy: 220
Total Body Time: 0:45
Protocol For Photochemotherapy For Severe Photoresponsive Dermatoses: Puva: The patient received Photochemotherapy for severe photoresponsive dermatoses: PUVA requiring at least 4 to 8 hours of care under direct physician supervision.
Protocol: NBUVB
Protocol For Puva: The patient received PUVA.
Protocol For Photochemotherapy: Petrolatum And Broad Band Uvb: The patient received Photochemotherapy: Petrolatum and Broad Band UVB.
Protocol For Photochemotherapy: Mineral Oil And Nbuvb: The patient received Photochemotherapy: Mineral Oil and NBUVB (mineral oil applied to all lesions prior to phototherapy).
Render Post-Care In The Note: no
Protocol For Photochemotherapy: Tar And Nbuvb (Goeckerman Treatment): The patient received Photochemotherapy: Tar and NBUVB (Goeckerman treatment).
Protocol For Uva1: The patient received UVA1.
Protocol For Bath Puva: The patient received Bath PUVA.
Protocol For Photochemotherapy: Mineral Oil And Broad Band Uvb: The patient received Photochemotherapy: Mineral Oil and Broad Band UVB.
Protocol For Uva: The patient received UVA.
Detail Level: Zone
Irradiance (Optional- Include Units): 5.2
Protocol For Photochemotherapy: Triamcinolone Ointment And Nbuvb: The patient received Photochemotherapy: Triamcinolone and NBUVB (triamcinolone ointment applied to all lesions prior to phototherapy).

## 2025-06-09 ENCOUNTER — APPOINTMENT (OUTPATIENT)
Dept: URBAN - METROPOLITAN AREA CLINIC 236 | Facility: CLINIC | Age: 75
Setting detail: DERMATOLOGY
End: 2025-06-09

## 2025-06-09 DIAGNOSIS — C84.0 MYCOSIS FUNGOIDES: ICD-10-CM

## 2025-06-09 PROBLEM — C84.00 MYCOSIS FUNGOIDES, UNSPECIFIED SITE: Status: ACTIVE | Noted: 2025-06-09

## 2025-06-09 PROCEDURE — ? PHOTOTHERAPY TREATMENT

## 2025-06-09 PROCEDURE — ? COUNSELING

## 2025-06-09 ASSESSMENT — LOCATION DETAILED DESCRIPTION DERM
LOCATION DETAILED: LEFT PROXIMAL PRETIBIAL REGION
LOCATION DETAILED: RIGHT PROXIMAL PRETIBIAL REGION
LOCATION DETAILED: LEFT ANTERIOR PROXIMAL THIGH
LOCATION DETAILED: LEFT BUTTOCK
LOCATION DETAILED: RIGHT ANTERIOR DISTAL THIGH

## 2025-06-09 ASSESSMENT — LOCATION SIMPLE DESCRIPTION DERM
LOCATION SIMPLE: RIGHT PRETIBIAL REGION
LOCATION SIMPLE: RIGHT THIGH
LOCATION SIMPLE: LEFT THIGH
LOCATION SIMPLE: LEFT PRETIBIAL REGION
LOCATION SIMPLE: LEFT BUTTOCK

## 2025-06-09 ASSESSMENT — LOCATION ZONE DERM
LOCATION ZONE: LEG
LOCATION ZONE: TRUNK

## 2025-06-09 NOTE — PROCEDURE: PHOTOTHERAPY TREATMENT
Name Of Supervising Technician: LING
Protocol For Protocol For Photochemotherapy For Severe Photoresponsive Dermatoses: Bath Puva: The patient received Photochemotherapy for severe photoresponsive dermatoses: Bath PUVA requiring at least 4 to 8 hours of care under direct physician supervision.
Protocol For Photochemotherapy For Severe Photoresponsive Dermatoses: Petrolatum And Nbuvb: The patient received Photochemotherapy for severe photoresponsive dermatoses: Petrolatum and NBUVB requiring at least 4 to 8 hours of care under direct physician supervision.
Protocol For Broad Band Uvb: The patient received Broad Band UVB.
Protocol For Photochemotherapy For Severe Photoresponsive Dermatoses: Tar And Nbuvb (Goeckerman Treatment): The patient received Photochemotherapy for severe photoresponsive dermatoses: Tar and NBUVB (Goeckerman treatment) requiring at least 4 to 8 hours of care under direct physician supervision.
Treatment Number: 10
Protocol For Photochemotherapy For Severe Photoresponsive Dermatoses: Tar And Broad Band Uvb (Goeckerman Treatment): The patient received Photochemotherapy for severe photoresponsive dermatoses: Tar and Broad Band UVB (Goeckerman treatment) requiring at least 4 to 8 hours of care under direct physician supervision.
Protocol For Photochemotherapy: Petrolatum And Nbuvb: The patient received Photochemotherapy: Petrolatum and NBUVB (petrolatum applied to all lesions prior to phototherapy).
Post-Care Instructions: I reviewed with the patient in detail post-care instructions. Patient is to wear sun protection. Patients may expect sunburn like redness, discomfort and scabbing.
Protocol For Nb Uva: The patient received NB UVA.
Protocol For Photochemotherapy: Baby Oil And Nbuvb: The patient received Photochemotherapy: Baby Oil and NBUVB (baby oil applied to all lesions prior to phototherapy).
Protocol For Photochemotherapy: Tar And Broad Band Uvb (Goeckerman Treatment): The patient received Photochemotherapy: Tar and Broad Band UVB (Goeckerman treatment).
Protocol For Nbuvb: The patient received NBUVB.
Consent: Written consent obtained.  The risks were reviewed with the patient including but not limited to: burn, pigmentary changes, pain, blistering, scabbing, redness, increased risk of skin cancers, and the remote possibility of scarring.
Protocol For Photochemotherapy For Severe Photoresponsive Dermatoses: Petrolatum And Broad Band Uvb: The patient received Photochemotherapyfor severe photoresponsive dermatoses: Petrolatum and Broad Band UVB requiring at least 4 to 8 hours of care under direct physician supervision.
Total Body Energy: 240
Total Body Time: 0:49
Protocol For Photochemotherapy For Severe Photoresponsive Dermatoses: Puva: The patient received Photochemotherapy for severe photoresponsive dermatoses: PUVA requiring at least 4 to 8 hours of care under direct physician supervision.
Protocol: NBUVB
Protocol For Puva: The patient received PUVA.
Protocol For Photochemotherapy: Petrolatum And Broad Band Uvb: The patient received Photochemotherapy: Petrolatum and Broad Band UVB.
Protocol For Photochemotherapy: Mineral Oil And Nbuvb: The patient received Photochemotherapy: Mineral Oil and NBUVB (mineral oil applied to all lesions prior to phototherapy).
Render Post-Care In The Note: no
Protocol For Photochemotherapy: Tar And Nbuvb (Goeckerman Treatment): The patient received Photochemotherapy: Tar and NBUVB (Goeckerman treatment).
Protocol For Uva1: The patient received UVA1.
Protocol For Bath Puva: The patient received Bath PUVA.
Protocol For Photochemotherapy: Mineral Oil And Broad Band Uvb: The patient received Photochemotherapy: Mineral Oil and Broad Band UVB.
Protocol For Uva: The patient received UVA.
Detail Level: Zone
Irradiance (Optional- Include Units): 5.2
Protocol For Photochemotherapy: Triamcinolone Ointment And Nbuvb: The patient received Photochemotherapy: Triamcinolone and NBUVB (triamcinolone ointment applied to all lesions prior to phototherapy).

## 2025-06-12 ENCOUNTER — APPOINTMENT (OUTPATIENT)
Dept: URBAN - METROPOLITAN AREA CLINIC 236 | Facility: CLINIC | Age: 75
Setting detail: DERMATOLOGY
End: 2025-06-12

## 2025-06-12 DIAGNOSIS — C84.0 MYCOSIS FUNGOIDES: ICD-10-CM | Status: IMPROVED

## 2025-06-12 PROBLEM — C84.00 MYCOSIS FUNGOIDES, UNSPECIFIED SITE: Status: ACTIVE | Noted: 2025-06-12

## 2025-06-12 PROCEDURE — ? PRESCRIPTION MEDICATION MANAGEMENT

## 2025-06-12 PROCEDURE — ? COUNSELING

## 2025-06-12 ASSESSMENT — LOCATION DETAILED DESCRIPTION DERM
LOCATION DETAILED: LEFT PROXIMAL PRETIBIAL REGION
LOCATION DETAILED: RIGHT PROXIMAL PRETIBIAL REGION
LOCATION DETAILED: LEFT BUTTOCK
LOCATION DETAILED: RIGHT ANTERIOR DISTAL THIGH
LOCATION DETAILED: LEFT ANTERIOR PROXIMAL THIGH

## 2025-06-12 ASSESSMENT — LOCATION SIMPLE DESCRIPTION DERM
LOCATION SIMPLE: LEFT BUTTOCK
LOCATION SIMPLE: LEFT PRETIBIAL REGION
LOCATION SIMPLE: RIGHT PRETIBIAL REGION
LOCATION SIMPLE: LEFT THIGH
LOCATION SIMPLE: RIGHT THIGH

## 2025-06-12 ASSESSMENT — LOCATION ZONE DERM
LOCATION ZONE: TRUNK
LOCATION ZONE: LEG

## 2025-06-12 ASSESSMENT — BSA RASH: BSA RASH: 2

## 2025-06-12 NOTE — PROCEDURE: PRESCRIPTION MEDICATION MANAGEMENT
Render In Strict Bullet Format?: No
Continue Regimen: Methotrexate 2.5mg tablets take 6 every week (as prescribed by Dr. Bina Peralta)\\n\\nNBUVB 3x/week
Detail Level: Zone
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
Plan: Patient is also being managed with Dr. Bina Peralta. Dr. Peralta is leaving her practice, care being handed over to Dr. Suzanna Olson.

## 2025-06-13 ENCOUNTER — APPOINTMENT (OUTPATIENT)
Dept: URBAN - METROPOLITAN AREA CLINIC 236 | Facility: CLINIC | Age: 75
Setting detail: DERMATOLOGY
End: 2025-06-13

## 2025-06-13 DIAGNOSIS — C84.0 MYCOSIS FUNGOIDES: ICD-10-CM

## 2025-06-13 PROBLEM — C84.00 MYCOSIS FUNGOIDES, UNSPECIFIED SITE: Status: ACTIVE | Noted: 2025-06-13

## 2025-06-13 PROCEDURE — ? COUNSELING

## 2025-06-13 PROCEDURE — ? PHOTOTHERAPY TREATMENT

## 2025-06-13 ASSESSMENT — LOCATION DETAILED DESCRIPTION DERM
LOCATION DETAILED: LEFT PROXIMAL PRETIBIAL REGION
LOCATION DETAILED: LEFT ANTERIOR PROXIMAL THIGH
LOCATION DETAILED: RIGHT PROXIMAL PRETIBIAL REGION
LOCATION DETAILED: LEFT BUTTOCK
LOCATION DETAILED: RIGHT ANTERIOR DISTAL THIGH

## 2025-06-13 ASSESSMENT — LOCATION SIMPLE DESCRIPTION DERM
LOCATION SIMPLE: RIGHT PRETIBIAL REGION
LOCATION SIMPLE: RIGHT THIGH
LOCATION SIMPLE: LEFT BUTTOCK
LOCATION SIMPLE: LEFT THIGH
LOCATION SIMPLE: LEFT PRETIBIAL REGION

## 2025-06-13 ASSESSMENT — LOCATION ZONE DERM
LOCATION ZONE: TRUNK
LOCATION ZONE: LEG

## 2025-06-13 NOTE — PROCEDURE: PHOTOTHERAPY TREATMENT
Name Of Supervising Technician: LING
Protocol For Protocol For Photochemotherapy For Severe Photoresponsive Dermatoses: Bath Puva: The patient received Photochemotherapy for severe photoresponsive dermatoses: Bath PUVA requiring at least 4 to 8 hours of care under direct physician supervision.
Protocol For Photochemotherapy For Severe Photoresponsive Dermatoses: Petrolatum And Nbuvb: The patient received Photochemotherapy for severe photoresponsive dermatoses: Petrolatum and NBUVB requiring at least 4 to 8 hours of care under direct physician supervision.
Protocol For Broad Band Uvb: The patient received Broad Band UVB.
Protocol For Photochemotherapy For Severe Photoresponsive Dermatoses: Tar And Nbuvb (Goeckerman Treatment): The patient received Photochemotherapy for severe photoresponsive dermatoses: Tar and NBUVB (Goeckerman treatment) requiring at least 4 to 8 hours of care under direct physician supervision.
Treatment Number: 11
Protocol For Photochemotherapy For Severe Photoresponsive Dermatoses: Tar And Broad Band Uvb (Goeckerman Treatment): The patient received Photochemotherapy for severe photoresponsive dermatoses: Tar and Broad Band UVB (Goeckerman treatment) requiring at least 4 to 8 hours of care under direct physician supervision.
Protocol For Photochemotherapy: Petrolatum And Nbuvb: The patient received Photochemotherapy: Petrolatum and NBUVB (petrolatum applied to all lesions prior to phototherapy).
Post-Care Instructions: I reviewed with the patient in detail post-care instructions. Patient is to wear sun protection. Patients may expect sunburn like redness, discomfort and scabbing.
Protocol For Nb Uva: The patient received NB UVA.
Protocol For Photochemotherapy: Baby Oil And Nbuvb: The patient received Photochemotherapy: Baby Oil and NBUVB (baby oil applied to all lesions prior to phototherapy).
Protocol For Photochemotherapy: Tar And Broad Band Uvb (Goeckerman Treatment): The patient received Photochemotherapy: Tar and Broad Band UVB (Goeckerman treatment).
Protocol For Nbuvb: The patient received NBUVB.
Consent: Written consent obtained.  The risks were reviewed with the patient including but not limited to: burn, pigmentary changes, pain, blistering, scabbing, redness, increased risk of skin cancers, and the remote possibility of scarring.
Protocol For Photochemotherapy For Severe Photoresponsive Dermatoses: Petrolatum And Broad Band Uvb: The patient received Photochemotherapyfor severe photoresponsive dermatoses: Petrolatum and Broad Band UVB requiring at least 4 to 8 hours of care under direct physician supervision.
Total Body Energy: 260
Total Body Time: 0:50
Protocol For Photochemotherapy For Severe Photoresponsive Dermatoses: Puva: The patient received Photochemotherapy for severe photoresponsive dermatoses: PUVA requiring at least 4 to 8 hours of care under direct physician supervision.
Protocol: NBUVB
Protocol For Puva: The patient received PUVA.
Protocol For Photochemotherapy: Petrolatum And Broad Band Uvb: The patient received Photochemotherapy: Petrolatum and Broad Band UVB.
Protocol For Photochemotherapy: Mineral Oil And Nbuvb: The patient received Photochemotherapy: Mineral Oil and NBUVB (mineral oil applied to all lesions prior to phototherapy).
Render Post-Care In The Note: no
Protocol For Photochemotherapy: Tar And Nbuvb (Goeckerman Treatment): The patient received Photochemotherapy: Tar and NBUVB (Goeckerman treatment).
Protocol For Uva1: The patient received UVA1.
Protocol For Bath Puva: The patient received Bath PUVA.
Protocol For Photochemotherapy: Mineral Oil And Broad Band Uvb: The patient received Photochemotherapy: Mineral Oil and Broad Band UVB.
Protocol For Uva: The patient received UVA.
Detail Level: Zone
Irradiance (Optional- Include Units): 5.2
Protocol For Photochemotherapy: Triamcinolone Ointment And Nbuvb: The patient received Photochemotherapy: Triamcinolone and NBUVB (triamcinolone ointment applied to all lesions prior to phototherapy).

## 2025-06-17 ENCOUNTER — APPOINTMENT (OUTPATIENT)
Dept: URBAN - METROPOLITAN AREA CLINIC 236 | Facility: CLINIC | Age: 75
Setting detail: DERMATOLOGY
End: 2025-06-17

## 2025-06-17 DIAGNOSIS — C84.0 MYCOSIS FUNGOIDES: ICD-10-CM | Status: INADEQUATELY CONTROLLED

## 2025-06-17 PROBLEM — C84.00 MYCOSIS FUNGOIDES, UNSPECIFIED SITE: Status: ACTIVE | Noted: 2025-06-17

## 2025-06-17 PROCEDURE — ? PHOTOTHERAPY TREATMENT

## 2025-06-17 PROCEDURE — ? COUNSELING

## 2025-06-17 ASSESSMENT — LOCATION DETAILED DESCRIPTION DERM
LOCATION DETAILED: RIGHT ANTERIOR DISTAL THIGH
LOCATION DETAILED: LEFT BUTTOCK
LOCATION DETAILED: LEFT ANTERIOR PROXIMAL THIGH
LOCATION DETAILED: LEFT PROXIMAL PRETIBIAL REGION
LOCATION DETAILED: RIGHT PROXIMAL PRETIBIAL REGION

## 2025-06-17 ASSESSMENT — LOCATION SIMPLE DESCRIPTION DERM
LOCATION SIMPLE: LEFT THIGH
LOCATION SIMPLE: RIGHT PRETIBIAL REGION
LOCATION SIMPLE: LEFT BUTTOCK
LOCATION SIMPLE: RIGHT THIGH
LOCATION SIMPLE: LEFT PRETIBIAL REGION

## 2025-06-17 ASSESSMENT — LOCATION ZONE DERM
LOCATION ZONE: LEG
LOCATION ZONE: TRUNK

## 2025-06-17 NOTE — PROCEDURE: PHOTOTHERAPY TREATMENT
Name Of Supervising Technician: LING
Protocol For Protocol For Photochemotherapy For Severe Photoresponsive Dermatoses: Bath Puva: The patient received Photochemotherapy for severe photoresponsive dermatoses: Bath PUVA requiring at least 4 to 8 hours of care under direct physician supervision.
Protocol For Photochemotherapy For Severe Photoresponsive Dermatoses: Petrolatum And Nbuvb: The patient received Photochemotherapy for severe photoresponsive dermatoses: Petrolatum and NBUVB requiring at least 4 to 8 hours of care under direct physician supervision.
Protocol For Broad Band Uvb: The patient received Broad Band UVB.
Protocol For Photochemotherapy For Severe Photoresponsive Dermatoses: Tar And Nbuvb (Goeckerman Treatment): The patient received Photochemotherapy for severe photoresponsive dermatoses: Tar and NBUVB (Goeckerman treatment) requiring at least 4 to 8 hours of care under direct physician supervision.
Treatment Number: 12
Protocol For Photochemotherapy For Severe Photoresponsive Dermatoses: Tar And Broad Band Uvb (Goeckerman Treatment): The patient received Photochemotherapy for severe photoresponsive dermatoses: Tar and Broad Band UVB (Goeckerman treatment) requiring at least 4 to 8 hours of care under direct physician supervision.
Protocol For Photochemotherapy: Petrolatum And Nbuvb: The patient received Photochemotherapy: Petrolatum and NBUVB (petrolatum applied to all lesions prior to phototherapy).
Post-Care Instructions: I reviewed with the patient in detail post-care instructions. Patient is to wear sun protection. Patients may expect sunburn like redness, discomfort and scabbing.
Protocol For Nb Uva: The patient received NB UVA.
Protocol For Photochemotherapy: Baby Oil And Nbuvb: The patient received Photochemotherapy: Baby Oil and NBUVB (baby oil applied to all lesions prior to phototherapy).
Protocol For Photochemotherapy: Tar And Broad Band Uvb (Goeckerman Treatment): The patient received Photochemotherapy: Tar and Broad Band UVB (Goeckerman treatment).
Protocol For Nbuvb: The patient received NBUVB.
Consent: Written consent obtained.  The risks were reviewed with the patient including but not limited to: burn, pigmentary changes, pain, blistering, scabbing, redness, increased risk of skin cancers, and the remote possibility of scarring.
Protocol For Photochemotherapy For Severe Photoresponsive Dermatoses: Petrolatum And Broad Band Uvb: The patient received Photochemotherapyfor severe photoresponsive dermatoses: Petrolatum and Broad Band UVB requiring at least 4 to 8 hours of care under direct physician supervision.
Total Body Energy: 280
Total Body Time: 0:54
Protocol For Photochemotherapy For Severe Photoresponsive Dermatoses: Puva: The patient received Photochemotherapy for severe photoresponsive dermatoses: PUVA requiring at least 4 to 8 hours of care under direct physician supervision.
Protocol: NBUVB
Protocol For Puva: The patient received PUVA.
Protocol For Photochemotherapy: Petrolatum And Broad Band Uvb: The patient received Photochemotherapy: Petrolatum and Broad Band UVB.
Protocol For Photochemotherapy: Mineral Oil And Nbuvb: The patient received Photochemotherapy: Mineral Oil and NBUVB (mineral oil applied to all lesions prior to phototherapy).
Render Post-Care In The Note: no
Protocol For Photochemotherapy: Tar And Nbuvb (Goeckerman Treatment): The patient received Photochemotherapy: Tar and NBUVB (Goeckerman treatment).
Protocol For Uva1: The patient received UVA1.
Protocol For Bath Puva: The patient received Bath PUVA.
Protocol For Photochemotherapy: Mineral Oil And Broad Band Uvb: The patient received Photochemotherapy: Mineral Oil and Broad Band UVB.
Protocol For Uva: The patient received UVA.
Detail Level: Zone
Irradiance (Optional- Include Units): 5.2
Protocol For Photochemotherapy: Triamcinolone Ointment And Nbuvb: The patient received Photochemotherapy: Triamcinolone and NBUVB (triamcinolone ointment applied to all lesions prior to phototherapy).

## 2025-06-19 ENCOUNTER — APPOINTMENT (OUTPATIENT)
Dept: URBAN - METROPOLITAN AREA CLINIC 236 | Facility: CLINIC | Age: 75
Setting detail: DERMATOLOGY
End: 2025-06-19

## 2025-06-19 DIAGNOSIS — C84.0 MYCOSIS FUNGOIDES: ICD-10-CM

## 2025-06-19 PROBLEM — C84.00 MYCOSIS FUNGOIDES, UNSPECIFIED SITE: Status: ACTIVE | Noted: 2025-06-19

## 2025-06-19 PROCEDURE — ? PHOTOTHERAPY TREATMENT

## 2025-06-19 PROCEDURE — ? COUNSELING

## 2025-06-19 ASSESSMENT — LOCATION DETAILED DESCRIPTION DERM
LOCATION DETAILED: LEFT ANTERIOR PROXIMAL THIGH
LOCATION DETAILED: LEFT BUTTOCK
LOCATION DETAILED: RIGHT ANTERIOR DISTAL THIGH
LOCATION DETAILED: RIGHT PROXIMAL PRETIBIAL REGION
LOCATION DETAILED: LEFT PROXIMAL PRETIBIAL REGION

## 2025-06-19 ASSESSMENT — LOCATION ZONE DERM
LOCATION ZONE: LEG
LOCATION ZONE: TRUNK

## 2025-06-19 NOTE — PROCEDURE: PHOTOTHERAPY TREATMENT
Name Of Supervising Technician: AT
Protocol For Protocol For Photochemotherapy For Severe Photoresponsive Dermatoses: Bath Puva: The patient received Photochemotherapy for severe photoresponsive dermatoses: Bath PUVA requiring at least 4 to 8 hours of care under direct physician supervision.
Protocol For Photochemotherapy For Severe Photoresponsive Dermatoses: Petrolatum And Nbuvb: The patient received Photochemotherapy for severe photoresponsive dermatoses: Petrolatum and NBUVB requiring at least 4 to 8 hours of care under direct physician supervision.
Protocol For Broad Band Uvb: The patient received Broad Band UVB.
Protocol For Photochemotherapy For Severe Photoresponsive Dermatoses: Tar And Nbuvb (Goeckerman Treatment): The patient received Photochemotherapy for severe photoresponsive dermatoses: Tar and NBUVB (Goeckerman treatment) requiring at least 4 to 8 hours of care under direct physician supervision.
Treatment Number: 13
Protocol For Photochemotherapy For Severe Photoresponsive Dermatoses: Tar And Broad Band Uvb (Goeckerman Treatment): The patient received Photochemotherapy for severe photoresponsive dermatoses: Tar and Broad Band UVB (Goeckerman treatment) requiring at least 4 to 8 hours of care under direct physician supervision.
Protocol For Photochemotherapy: Petrolatum And Nbuvb: The patient received Photochemotherapy: Petrolatum and NBUVB (petrolatum applied to all lesions prior to phototherapy).
Post-Care Instructions: I reviewed with the patient in detail post-care instructions. Patient is to wear sun protection. Patients may expect sunburn like redness, discomfort and scabbing.
Protocol For Nb Uva: The patient received NB UVA.
Protocol For Photochemotherapy: Baby Oil And Nbuvb: The patient received Photochemotherapy: Baby Oil and NBUVB (baby oil applied to all lesions prior to phototherapy).
Protocol For Photochemotherapy: Tar And Broad Band Uvb (Goeckerman Treatment): The patient received Photochemotherapy: Tar and Broad Band UVB (Goeckerman treatment).
Protocol For Nbuvb: The patient received NBUVB.
Consent: Written consent obtained.  The risks were reviewed with the patient including but not limited to: burn, pigmentary changes, pain, blistering, scabbing, redness, increased risk of skin cancers, and the remote possibility of scarring.
Protocol For Photochemotherapy For Severe Photoresponsive Dermatoses: Petrolatum And Broad Band Uvb: The patient received Photochemotherapyfor severe photoresponsive dermatoses: Petrolatum and Broad Band UVB requiring at least 4 to 8 hours of care under direct physician supervision.
Total Body Energy: 300
Total Body Time: 0:58
Protocol For Photochemotherapy For Severe Photoresponsive Dermatoses: Puva: The patient received Photochemotherapy for severe photoresponsive dermatoses: PUVA requiring at least 4 to 8 hours of care under direct physician supervision.
Protocol: NBUVB
Protocol For Puva: The patient received PUVA.
Protocol For Photochemotherapy: Petrolatum And Broad Band Uvb: The patient received Photochemotherapy: Petrolatum and Broad Band UVB.
Protocol For Photochemotherapy: Mineral Oil And Nbuvb: The patient received Photochemotherapy: Mineral Oil and NBUVB (mineral oil applied to all lesions prior to phototherapy).
Render Post-Care In The Note: no
Protocol For Photochemotherapy: Tar And Nbuvb (Goeckerman Treatment): The patient received Photochemotherapy: Tar and NBUVB (Goeckerman treatment).
Protocol For Uva1: The patient received UVA1.
Protocol For Bath Puva: The patient received Bath PUVA.
Protocol For Photochemotherapy: Mineral Oil And Broad Band Uvb: The patient received Photochemotherapy: Mineral Oil and Broad Band UVB.
Protocol For Uva: The patient received UVA.
Detail Level: Zone
Irradiance (Optional- Include Units): 5.2
Protocol For Photochemotherapy: Triamcinolone Ointment And Nbuvb: The patient received Photochemotherapy: Triamcinolone and NBUVB (triamcinolone ointment applied to all lesions prior to phototherapy).

## 2025-06-23 ENCOUNTER — APPOINTMENT (OUTPATIENT)
Dept: URBAN - METROPOLITAN AREA CLINIC 236 | Facility: CLINIC | Age: 75
Setting detail: DERMATOLOGY
End: 2025-06-23

## 2025-06-23 DIAGNOSIS — C84.0 MYCOSIS FUNGOIDES: ICD-10-CM | Status: INADEQUATELY CONTROLLED

## 2025-06-23 PROBLEM — C84.00 MYCOSIS FUNGOIDES, UNSPECIFIED SITE: Status: ACTIVE | Noted: 2025-06-23

## 2025-06-23 PROCEDURE — ? COUNSELING

## 2025-06-23 PROCEDURE — ? PHOTOTHERAPY TREATMENT

## 2025-06-23 ASSESSMENT — LOCATION SIMPLE DESCRIPTION DERM
LOCATION SIMPLE: RIGHT PRETIBIAL REGION
LOCATION SIMPLE: LEFT BUTTOCK
LOCATION SIMPLE: LEFT THIGH
LOCATION SIMPLE: RIGHT THIGH
LOCATION SIMPLE: LEFT PRETIBIAL REGION

## 2025-06-23 ASSESSMENT — LOCATION DETAILED DESCRIPTION DERM
LOCATION DETAILED: RIGHT PROXIMAL PRETIBIAL REGION
LOCATION DETAILED: LEFT ANTERIOR PROXIMAL THIGH
LOCATION DETAILED: LEFT BUTTOCK
LOCATION DETAILED: RIGHT ANTERIOR DISTAL THIGH
LOCATION DETAILED: LEFT PROXIMAL PRETIBIAL REGION

## 2025-06-23 ASSESSMENT — LOCATION ZONE DERM
LOCATION ZONE: LEG
LOCATION ZONE: TRUNK

## 2025-06-23 NOTE — PROCEDURE: PHOTOTHERAPY TREATMENT
Name Of Supervising Technician: AT
Protocol For Protocol For Photochemotherapy For Severe Photoresponsive Dermatoses: Bath Puva: The patient received Photochemotherapy for severe photoresponsive dermatoses: Bath PUVA requiring at least 4 to 8 hours of care under direct physician supervision.
Protocol For Photochemotherapy For Severe Photoresponsive Dermatoses: Petrolatum And Nbuvb: The patient received Photochemotherapy for severe photoresponsive dermatoses: Petrolatum and NBUVB requiring at least 4 to 8 hours of care under direct physician supervision.
Protocol For Broad Band Uvb: The patient received Broad Band UVB.
Protocol For Photochemotherapy For Severe Photoresponsive Dermatoses: Tar And Nbuvb (Goeckerman Treatment): The patient received Photochemotherapy for severe photoresponsive dermatoses: Tar and NBUVB (Goeckerman treatment) requiring at least 4 to 8 hours of care under direct physician supervision.
Treatment Number: 14
Protocol For Photochemotherapy For Severe Photoresponsive Dermatoses: Tar And Broad Band Uvb (Goeckerman Treatment): The patient received Photochemotherapy for severe photoresponsive dermatoses: Tar and Broad Band UVB (Goeckerman treatment) requiring at least 4 to 8 hours of care under direct physician supervision.
Protocol For Photochemotherapy: Petrolatum And Nbuvb: The patient received Photochemotherapy: Petrolatum and NBUVB (petrolatum applied to all lesions prior to phototherapy).
Post-Care Instructions: I reviewed with the patient in detail post-care instructions. Patient is to wear sun protection. Patients may expect sunburn like redness, discomfort and scabbing.
Protocol For Nb Uva: The patient received NB UVA.
Protocol For Photochemotherapy: Baby Oil And Nbuvb: The patient received Photochemotherapy: Baby Oil and NBUVB (baby oil applied to all lesions prior to phototherapy).
Protocol For Photochemotherapy: Tar And Broad Band Uvb (Goeckerman Treatment): The patient received Photochemotherapy: Tar and Broad Band UVB (Goeckerman treatment).
Protocol For Nbuvb: The patient received NBUVB.
Consent: Written consent obtained.  The risks were reviewed with the patient including but not limited to: burn, pigmentary changes, pain, blistering, scabbing, redness, increased risk of skin cancers, and the remote possibility of scarring.
Protocol For Photochemotherapy For Severe Photoresponsive Dermatoses: Petrolatum And Broad Band Uvb: The patient received Photochemotherapyfor severe photoresponsive dermatoses: Petrolatum and Broad Band UVB requiring at least 4 to 8 hours of care under direct physician supervision.
Total Body Energy: 320
Total Body Time: 1:02
Protocol For Photochemotherapy For Severe Photoresponsive Dermatoses: Puva: The patient received Photochemotherapy for severe photoresponsive dermatoses: PUVA requiring at least 4 to 8 hours of care under direct physician supervision.
Protocol: NBUVB
Protocol For Puva: The patient received PUVA.
Protocol For Photochemotherapy: Petrolatum And Broad Band Uvb: The patient received Photochemotherapy: Petrolatum and Broad Band UVB.
Protocol For Photochemotherapy: Mineral Oil And Nbuvb: The patient received Photochemotherapy: Mineral Oil and NBUVB (mineral oil applied to all lesions prior to phototherapy).
Render Post-Care In The Note: no
Protocol For Photochemotherapy: Tar And Nbuvb (Goeckerman Treatment): The patient received Photochemotherapy: Tar and NBUVB (Goeckerman treatment).
Protocol For Uva1: The patient received UVA1.
Protocol For Bath Puva: The patient received Bath PUVA.
Protocol For Photochemotherapy: Mineral Oil And Broad Band Uvb: The patient received Photochemotherapy: Mineral Oil and Broad Band UVB.
Protocol For Uva: The patient received UVA.
Detail Level: Zone
Irradiance (Optional- Include Units): 5.2
Protocol For Photochemotherapy: Triamcinolone Ointment And Nbuvb: The patient received Photochemotherapy: Triamcinolone and NBUVB (triamcinolone ointment applied to all lesions prior to phototherapy).

## 2025-06-25 ENCOUNTER — APPOINTMENT (OUTPATIENT)
Dept: URBAN - METROPOLITAN AREA CLINIC 236 | Facility: CLINIC | Age: 75
Setting detail: DERMATOLOGY
End: 2025-06-25

## 2025-06-25 DIAGNOSIS — C84.0 MYCOSIS FUNGOIDES: ICD-10-CM

## 2025-06-25 PROBLEM — C84.00 MYCOSIS FUNGOIDES, UNSPECIFIED SITE: Status: ACTIVE | Noted: 2025-06-25

## 2025-06-25 PROCEDURE — ? PHOTOTHERAPY TREATMENT

## 2025-06-25 PROCEDURE — ? COUNSELING

## 2025-06-25 ASSESSMENT — LOCATION DETAILED DESCRIPTION DERM
LOCATION DETAILED: LEFT ANTERIOR PROXIMAL THIGH
LOCATION DETAILED: LEFT BUTTOCK
LOCATION DETAILED: RIGHT ANTERIOR DISTAL THIGH
LOCATION DETAILED: LEFT PROXIMAL PRETIBIAL REGION
LOCATION DETAILED: RIGHT PROXIMAL PRETIBIAL REGION

## 2025-06-25 ASSESSMENT — LOCATION ZONE DERM
LOCATION ZONE: TRUNK
LOCATION ZONE: LEG

## 2025-06-25 NOTE — PROCEDURE: PHOTOTHERAPY TREATMENT
Name Of Supervising Technician: Keny
Protocol For Protocol For Photochemotherapy For Severe Photoresponsive Dermatoses: Bath Puva: The patient received Photochemotherapy for severe photoresponsive dermatoses: Bath PUVA requiring at least 4 to 8 hours of care under direct physician supervision.
Protocol For Photochemotherapy For Severe Photoresponsive Dermatoses: Petrolatum And Nbuvb: The patient received Photochemotherapy for severe photoresponsive dermatoses: Petrolatum and NBUVB requiring at least 4 to 8 hours of care under direct physician supervision.
Protocol For Broad Band Uvb: The patient received Broad Band UVB.
Protocol For Photochemotherapy For Severe Photoresponsive Dermatoses: Tar And Nbuvb (Goeckerman Treatment): The patient received Photochemotherapy for severe photoresponsive dermatoses: Tar and NBUVB (Goeckerman treatment) requiring at least 4 to 8 hours of care under direct physician supervision.
Treatment Number: 15
Protocol For Photochemotherapy For Severe Photoresponsive Dermatoses: Tar And Broad Band Uvb (Goeckerman Treatment): The patient received Photochemotherapy for severe photoresponsive dermatoses: Tar and Broad Band UVB (Goeckerman treatment) requiring at least 4 to 8 hours of care under direct physician supervision.
Protocol For Photochemotherapy: Petrolatum And Nbuvb: The patient received Photochemotherapy: Petrolatum and NBUVB (petrolatum applied to all lesions prior to phototherapy).
Post-Care Instructions: I reviewed with the patient in detail post-care instructions. Patient is to wear sun protection. Patients may expect sunburn like redness, discomfort and scabbing.
Protocol For Nb Uva: The patient received NB UVA.
Protocol For Photochemotherapy: Baby Oil And Nbuvb: The patient received Photochemotherapy: Baby Oil and NBUVB (baby oil applied to all lesions prior to phototherapy).
Protocol For Photochemotherapy: Tar And Broad Band Uvb (Goeckerman Treatment): The patient received Photochemotherapy: Tar and Broad Band UVB (Goeckerman treatment).
Protocol For Nbuvb: The patient received NBUVB.
Consent: Written consent obtained.  The risks were reviewed with the patient including but not limited to: burn, pigmentary changes, pain, blistering, scabbing, redness, increased risk of skin cancers, and the remote possibility of scarring.
Protocol For Photochemotherapy For Severe Photoresponsive Dermatoses: Petrolatum And Broad Band Uvb: The patient received Photochemotherapyfor severe photoresponsive dermatoses: Petrolatum and Broad Band UVB requiring at least 4 to 8 hours of care under direct physician supervision.
Total Body Energy: 340
Total Body Time: 1:05
Protocol For Photochemotherapy For Severe Photoresponsive Dermatoses: Puva: The patient received Photochemotherapy for severe photoresponsive dermatoses: PUVA requiring at least 4 to 8 hours of care under direct physician supervision.
Protocol: NBUVB
Protocol For Puva: The patient received PUVA.
Protocol For Photochemotherapy: Petrolatum And Broad Band Uvb: The patient received Photochemotherapy: Petrolatum and Broad Band UVB.
Protocol For Photochemotherapy: Mineral Oil And Nbuvb: The patient received Photochemotherapy: Mineral Oil and NBUVB (mineral oil applied to all lesions prior to phototherapy).
Render Post-Care In The Note: no
Protocol For Photochemotherapy: Tar And Nbuvb (Goeckerman Treatment): The patient received Photochemotherapy: Tar and NBUVB (Goeckerman treatment).
Protocol For Uva1: The patient received UVA1.
Protocol For Bath Puva: The patient received Bath PUVA.
Protocol For Photochemotherapy: Mineral Oil And Broad Band Uvb: The patient received Photochemotherapy: Mineral Oil and Broad Band UVB.
Protocol For Uva: The patient received UVA.
Detail Level: Zone
Irradiance (Optional- Include Units): 5.2
Protocol For Photochemotherapy: Triamcinolone Ointment And Nbuvb: The patient received Photochemotherapy: Triamcinolone and NBUVB (triamcinolone ointment applied to all lesions prior to phototherapy).

## 2025-06-27 ENCOUNTER — APPOINTMENT (OUTPATIENT)
Dept: URBAN - METROPOLITAN AREA CLINIC 236 | Facility: CLINIC | Age: 75
Setting detail: DERMATOLOGY
End: 2025-06-27

## 2025-06-27 DIAGNOSIS — C84.0 MYCOSIS FUNGOIDES: ICD-10-CM

## 2025-06-27 PROBLEM — C84.00 MYCOSIS FUNGOIDES, UNSPECIFIED SITE: Status: ACTIVE | Noted: 2025-06-27

## 2025-06-27 PROCEDURE — ? COUNSELING

## 2025-06-27 PROCEDURE — ? PHOTOTHERAPY TREATMENT

## 2025-06-27 ASSESSMENT — LOCATION DETAILED DESCRIPTION DERM
LOCATION DETAILED: LEFT BUTTOCK
LOCATION DETAILED: RIGHT ANTERIOR DISTAL THIGH
LOCATION DETAILED: LEFT ANTERIOR PROXIMAL THIGH
LOCATION DETAILED: RIGHT PROXIMAL PRETIBIAL REGION
LOCATION DETAILED: LEFT PROXIMAL PRETIBIAL REGION

## 2025-06-27 ASSESSMENT — LOCATION SIMPLE DESCRIPTION DERM
LOCATION SIMPLE: LEFT THIGH
LOCATION SIMPLE: RIGHT PRETIBIAL REGION
LOCATION SIMPLE: RIGHT THIGH
LOCATION SIMPLE: LEFT PRETIBIAL REGION
LOCATION SIMPLE: LEFT BUTTOCK

## 2025-06-27 ASSESSMENT — LOCATION ZONE DERM
LOCATION ZONE: TRUNK
LOCATION ZONE: LEG

## 2025-06-27 NOTE — PROCEDURE: PHOTOTHERAPY TREATMENT
Name Of Supervising Technician: AT
Protocol For Protocol For Photochemotherapy For Severe Photoresponsive Dermatoses: Bath Puva: The patient received Photochemotherapy for severe photoresponsive dermatoses: Bath PUVA requiring at least 4 to 8 hours of care under direct physician supervision.
Protocol For Photochemotherapy For Severe Photoresponsive Dermatoses: Petrolatum And Nbuvb: The patient received Photochemotherapy for severe photoresponsive dermatoses: Petrolatum and NBUVB requiring at least 4 to 8 hours of care under direct physician supervision.
Protocol For Broad Band Uvb: The patient received Broad Band UVB.
Protocol For Photochemotherapy For Severe Photoresponsive Dermatoses: Tar And Nbuvb (Goeckerman Treatment): The patient received Photochemotherapy for severe photoresponsive dermatoses: Tar and NBUVB (Goeckerman treatment) requiring at least 4 to 8 hours of care under direct physician supervision.
Treatment Number: 16
Protocol For Photochemotherapy For Severe Photoresponsive Dermatoses: Tar And Broad Band Uvb (Goeckerman Treatment): The patient received Photochemotherapy for severe photoresponsive dermatoses: Tar and Broad Band UVB (Goeckerman treatment) requiring at least 4 to 8 hours of care under direct physician supervision.
Protocol For Photochemotherapy: Petrolatum And Nbuvb: The patient received Photochemotherapy: Petrolatum and NBUVB (petrolatum applied to all lesions prior to phototherapy).
Post-Care Instructions: I reviewed with the patient in detail post-care instructions. Patient is to wear sun protection. Patients may expect sunburn like redness, discomfort and scabbing.
Protocol For Nb Uva: The patient received NB UVA.
Protocol For Photochemotherapy: Baby Oil And Nbuvb: The patient received Photochemotherapy: Baby Oil and NBUVB (baby oil applied to all lesions prior to phototherapy).
Protocol For Photochemotherapy: Tar And Broad Band Uvb (Goeckerman Treatment): The patient received Photochemotherapy: Tar and Broad Band UVB (Goeckerman treatment).
Protocol For Nbuvb: The patient received NBUVB.
Consent: Written consent obtained.  The risks were reviewed with the patient including but not limited to: burn, pigmentary changes, pain, blistering, scabbing, redness, increased risk of skin cancers, and the remote possibility of scarring.
Protocol For Photochemotherapy For Severe Photoresponsive Dermatoses: Petrolatum And Broad Band Uvb: The patient received Photochemotherapyfor severe photoresponsive dermatoses: Petrolatum and Broad Band UVB requiring at least 4 to 8 hours of care under direct physician supervision.
Total Body Energy: 360
Total Body Time: 1:09
Protocol For Photochemotherapy For Severe Photoresponsive Dermatoses: Puva: The patient received Photochemotherapy for severe photoresponsive dermatoses: PUVA requiring at least 4 to 8 hours of care under direct physician supervision.
Protocol: NBUVB
Protocol For Puva: The patient received PUVA.
Protocol For Photochemotherapy: Petrolatum And Broad Band Uvb: The patient received Photochemotherapy: Petrolatum and Broad Band UVB.
Protocol For Photochemotherapy: Mineral Oil And Nbuvb: The patient received Photochemotherapy: Mineral Oil and NBUVB (mineral oil applied to all lesions prior to phototherapy).
Render Post-Care In The Note: no
Protocol For Photochemotherapy: Tar And Nbuvb (Goeckerman Treatment): The patient received Photochemotherapy: Tar and NBUVB (Goeckerman treatment).
Protocol For Uva1: The patient received UVA1.
Protocol For Bath Puva: The patient received Bath PUVA.
Protocol For Photochemotherapy: Mineral Oil And Broad Band Uvb: The patient received Photochemotherapy: Mineral Oil and Broad Band UVB.
Protocol For Uva: The patient received UVA.
Detail Level: Zone
Irradiance (Optional- Include Units): 5.2
Protocol For Photochemotherapy: Triamcinolone Ointment And Nbuvb: The patient received Photochemotherapy: Triamcinolone and NBUVB (triamcinolone ointment applied to all lesions prior to phototherapy).

## 2025-06-30 ENCOUNTER — APPOINTMENT (OUTPATIENT)
Dept: URBAN - METROPOLITAN AREA CLINIC 236 | Facility: CLINIC | Age: 75
Setting detail: DERMATOLOGY
End: 2025-06-30

## 2025-06-30 DIAGNOSIS — C84.0 MYCOSIS FUNGOIDES: ICD-10-CM

## 2025-06-30 PROBLEM — C84.00 MYCOSIS FUNGOIDES, UNSPECIFIED SITE: Status: ACTIVE | Noted: 2025-06-30

## 2025-06-30 PROCEDURE — ? PHOTOTHERAPY TREATMENT

## 2025-06-30 PROCEDURE — ? COUNSELING

## 2025-06-30 ASSESSMENT — LOCATION ZONE DERM
LOCATION ZONE: LEG
LOCATION ZONE: TRUNK

## 2025-06-30 NOTE — PROCEDURE: PHOTOTHERAPY TREATMENT
Name Of Supervising Technician: VIRIDIANA
Protocol For Protocol For Photochemotherapy For Severe Photoresponsive Dermatoses: Bath Puva: The patient received Photochemotherapy for severe photoresponsive dermatoses: Bath PUVA requiring at least 4 to 8 hours of care under direct physician supervision.
Protocol For Photochemotherapy For Severe Photoresponsive Dermatoses: Petrolatum And Nbuvb: The patient received Photochemotherapy for severe photoresponsive dermatoses: Petrolatum and NBUVB requiring at least 4 to 8 hours of care under direct physician supervision.
Protocol For Broad Band Uvb: The patient received Broad Band UVB.
Protocol For Photochemotherapy For Severe Photoresponsive Dermatoses: Tar And Nbuvb (Goeckerman Treatment): The patient received Photochemotherapy for severe photoresponsive dermatoses: Tar and NBUVB (Goeckerman treatment) requiring at least 4 to 8 hours of care under direct physician supervision.
Treatment Number: 17
Protocol For Photochemotherapy For Severe Photoresponsive Dermatoses: Tar And Broad Band Uvb (Goeckerman Treatment): The patient received Photochemotherapy for severe photoresponsive dermatoses: Tar and Broad Band UVB (Goeckerman treatment) requiring at least 4 to 8 hours of care under direct physician supervision.
Protocol For Photochemotherapy: Petrolatum And Nbuvb: The patient received Photochemotherapy: Petrolatum and NBUVB (petrolatum applied to all lesions prior to phototherapy).
Post-Care Instructions: I reviewed with the patient in detail post-care instructions. Patient is to wear sun protection. Patients may expect sunburn like redness, discomfort and scabbing.
Protocol For Nb Uva: The patient received NB UVA.
Protocol For Photochemotherapy: Baby Oil And Nbuvb: The patient received Photochemotherapy: Baby Oil and NBUVB (baby oil applied to all lesions prior to phototherapy).
Protocol For Photochemotherapy: Tar And Broad Band Uvb (Goeckerman Treatment): The patient received Photochemotherapy: Tar and Broad Band UVB (Goeckerman treatment).
Protocol For Nbuvb: The patient received NBUVB.
Consent: Written consent obtained.  The risks were reviewed with the patient including but not limited to: burn, pigmentary changes, pain, blistering, scabbing, redness, increased risk of skin cancers, and the remote possibility of scarring.
Protocol For Photochemotherapy For Severe Photoresponsive Dermatoses: Petrolatum And Broad Band Uvb: The patient received Photochemotherapyfor severe photoresponsive dermatoses: Petrolatum and Broad Band UVB requiring at least 4 to 8 hours of care under direct physician supervision.
Total Body Energy: 380
Total Body Time: 1:13
Protocol For Photochemotherapy For Severe Photoresponsive Dermatoses: Puva: The patient received Photochemotherapy for severe photoresponsive dermatoses: PUVA requiring at least 4 to 8 hours of care under direct physician supervision.
Protocol: NBUVB
Protocol For Puva: The patient received PUVA.
Protocol For Photochemotherapy: Petrolatum And Broad Band Uvb: The patient received Photochemotherapy: Petrolatum and Broad Band UVB.
Protocol For Photochemotherapy: Mineral Oil And Nbuvb: The patient received Photochemotherapy: Mineral Oil and NBUVB (mineral oil applied to all lesions prior to phototherapy).
Render Post-Care In The Note: no
Protocol For Photochemotherapy: Tar And Nbuvb (Goeckerman Treatment): The patient received Photochemotherapy: Tar and NBUVB (Goeckerman treatment).
Protocol For Uva1: The patient received UVA1.
Protocol For Bath Puva: The patient received Bath PUVA.
Protocol For Photochemotherapy: Mineral Oil And Broad Band Uvb: The patient received Photochemotherapy: Mineral Oil and Broad Band UVB.
Protocol For Uva: The patient received UVA.
Detail Level: Zone
Irradiance (Optional- Include Units): 5.2
Protocol For Photochemotherapy: Triamcinolone Ointment And Nbuvb: The patient received Photochemotherapy: Triamcinolone and NBUVB (triamcinolone ointment applied to all lesions prior to phototherapy).

## 2025-07-02 ENCOUNTER — APPOINTMENT (OUTPATIENT)
Dept: URBAN - METROPOLITAN AREA CLINIC 236 | Facility: CLINIC | Age: 75
Setting detail: DERMATOLOGY
End: 2025-07-02

## 2025-07-02 DIAGNOSIS — C84.0 MYCOSIS FUNGOIDES: ICD-10-CM

## 2025-07-02 PROBLEM — C84.00 MYCOSIS FUNGOIDES, UNSPECIFIED SITE: Status: ACTIVE | Noted: 2025-07-02

## 2025-07-02 PROCEDURE — ? COUNSELING

## 2025-07-02 PROCEDURE — ? PHOTOTHERAPY TREATMENT

## 2025-07-02 ASSESSMENT — LOCATION DETAILED DESCRIPTION DERM
LOCATION DETAILED: LEFT ANTERIOR PROXIMAL THIGH
LOCATION DETAILED: RIGHT ANTERIOR DISTAL THIGH
LOCATION DETAILED: LEFT PROXIMAL PRETIBIAL REGION
LOCATION DETAILED: LEFT BUTTOCK
LOCATION DETAILED: RIGHT PROXIMAL PRETIBIAL REGION

## 2025-07-02 ASSESSMENT — LOCATION ZONE DERM
LOCATION ZONE: TRUNK
LOCATION ZONE: LEG

## 2025-07-02 NOTE — PROCEDURE: PHOTOTHERAPY TREATMENT
Name Of Supervising Technician: VIRIDIANA
Protocol For Protocol For Photochemotherapy For Severe Photoresponsive Dermatoses: Bath Puva: The patient received Photochemotherapy for severe photoresponsive dermatoses: Bath PUVA requiring at least 4 to 8 hours of care under direct physician supervision.
Protocol For Photochemotherapy For Severe Photoresponsive Dermatoses: Petrolatum And Nbuvb: The patient received Photochemotherapy for severe photoresponsive dermatoses: Petrolatum and NBUVB requiring at least 4 to 8 hours of care under direct physician supervision.
Protocol For Broad Band Uvb: The patient received Broad Band UVB.
Protocol For Photochemotherapy For Severe Photoresponsive Dermatoses: Tar And Nbuvb (Goeckerman Treatment): The patient received Photochemotherapy for severe photoresponsive dermatoses: Tar and NBUVB (Goeckerman treatment) requiring at least 4 to 8 hours of care under direct physician supervision.
Treatment Number: 18
Protocol For Photochemotherapy For Severe Photoresponsive Dermatoses: Tar And Broad Band Uvb (Goeckerman Treatment): The patient received Photochemotherapy for severe photoresponsive dermatoses: Tar and Broad Band UVB (Goeckerman treatment) requiring at least 4 to 8 hours of care under direct physician supervision.
Protocol For Photochemotherapy: Petrolatum And Nbuvb: The patient received Photochemotherapy: Petrolatum and NBUVB (petrolatum applied to all lesions prior to phototherapy).
Post-Care Instructions: I reviewed with the patient in detail post-care instructions. Patient is to wear sun protection. Patients may expect sunburn like redness, discomfort and scabbing.
Protocol For Nb Uva: The patient received NB UVA.
Protocol For Photochemotherapy: Baby Oil And Nbuvb: The patient received Photochemotherapy: Baby Oil and NBUVB (baby oil applied to all lesions prior to phototherapy).
Protocol For Photochemotherapy: Tar And Broad Band Uvb (Goeckerman Treatment): The patient received Photochemotherapy: Tar and Broad Band UVB (Goeckerman treatment).
Protocol For Nbuvb: The patient received NBUVB.
Consent: Written consent obtained.  The risks were reviewed with the patient including but not limited to: burn, pigmentary changes, pain, blistering, scabbing, redness, increased risk of skin cancers, and the remote possibility of scarring.
Protocol For Photochemotherapy For Severe Photoresponsive Dermatoses: Petrolatum And Broad Band Uvb: The patient received Photochemotherapyfor severe photoresponsive dermatoses: Petrolatum and Broad Band UVB requiring at least 4 to 8 hours of care under direct physician supervision.
Total Body Energy: 400
Total Body Time: 1:17
Protocol For Photochemotherapy For Severe Photoresponsive Dermatoses: Puva: The patient received Photochemotherapy for severe photoresponsive dermatoses: PUVA requiring at least 4 to 8 hours of care under direct physician supervision.
Protocol: NBUVB
Protocol For Puva: The patient received PUVA.
Protocol For Photochemotherapy: Petrolatum And Broad Band Uvb: The patient received Photochemotherapy: Petrolatum and Broad Band UVB.
Protocol For Photochemotherapy: Mineral Oil And Nbuvb: The patient received Photochemotherapy: Mineral Oil and NBUVB (mineral oil applied to all lesions prior to phototherapy).
Render Post-Care In The Note: no
Protocol For Photochemotherapy: Tar And Nbuvb (Goeckerman Treatment): The patient received Photochemotherapy: Tar and NBUVB (Goeckerman treatment).
Protocol For Uva1: The patient received UVA1.
Protocol For Bath Puva: The patient received Bath PUVA.
Protocol For Photochemotherapy: Mineral Oil And Broad Band Uvb: The patient received Photochemotherapy: Mineral Oil and Broad Band UVB.
Protocol For Uva: The patient received UVA.
Detail Level: Zone
Irradiance (Optional- Include Units): 5.2
Protocol For Photochemotherapy: Triamcinolone Ointment And Nbuvb: The patient received Photochemotherapy: Triamcinolone and NBUVB (triamcinolone ointment applied to all lesions prior to phototherapy).

## 2025-07-07 ENCOUNTER — APPOINTMENT (OUTPATIENT)
Dept: URBAN - METROPOLITAN AREA CLINIC 236 | Facility: CLINIC | Age: 75
Setting detail: DERMATOLOGY
End: 2025-07-07

## 2025-07-07 DIAGNOSIS — C84.0 MYCOSIS FUNGOIDES: ICD-10-CM

## 2025-07-07 PROBLEM — C84.00 MYCOSIS FUNGOIDES, UNSPECIFIED SITE: Status: ACTIVE | Noted: 2025-07-07

## 2025-07-07 PROCEDURE — ? COUNSELING

## 2025-07-07 PROCEDURE — ? PHOTOTHERAPY TREATMENT

## 2025-07-07 ASSESSMENT — LOCATION DETAILED DESCRIPTION DERM
LOCATION DETAILED: LEFT ANTERIOR PROXIMAL THIGH
LOCATION DETAILED: LEFT PROXIMAL PRETIBIAL REGION
LOCATION DETAILED: LEFT BUTTOCK
LOCATION DETAILED: RIGHT ANTERIOR DISTAL THIGH
LOCATION DETAILED: RIGHT PROXIMAL PRETIBIAL REGION

## 2025-07-07 ASSESSMENT — LOCATION ZONE DERM
LOCATION ZONE: TRUNK
LOCATION ZONE: LEG

## 2025-07-07 NOTE — PROCEDURE: PHOTOTHERAPY TREATMENT
Name Of Supervising Technician: LING
Protocol For Protocol For Photochemotherapy For Severe Photoresponsive Dermatoses: Bath Puva: The patient received Photochemotherapy for severe photoresponsive dermatoses: Bath PUVA requiring at least 4 to 8 hours of care under direct physician supervision.
Protocol For Photochemotherapy For Severe Photoresponsive Dermatoses: Petrolatum And Nbuvb: The patient received Photochemotherapy for severe photoresponsive dermatoses: Petrolatum and NBUVB requiring at least 4 to 8 hours of care under direct physician supervision.
Protocol For Broad Band Uvb: The patient received Broad Band UVB.
Protocol For Photochemotherapy For Severe Photoresponsive Dermatoses: Tar And Nbuvb (Goeckerman Treatment): The patient received Photochemotherapy for severe photoresponsive dermatoses: Tar and NBUVB (Goeckerman treatment) requiring at least 4 to 8 hours of care under direct physician supervision.
Treatment Number: 19
Protocol For Photochemotherapy For Severe Photoresponsive Dermatoses: Tar And Broad Band Uvb (Goeckerman Treatment): The patient received Photochemotherapy for severe photoresponsive dermatoses: Tar and Broad Band UVB (Goeckerman treatment) requiring at least 4 to 8 hours of care under direct physician supervision.
Protocol For Photochemotherapy: Petrolatum And Nbuvb: The patient received Photochemotherapy: Petrolatum and NBUVB (petrolatum applied to all lesions prior to phototherapy).
Post-Care Instructions: I reviewed with the patient in detail post-care instructions. Patient is to wear sun protection. Patients may expect sunburn like redness, discomfort and scabbing.
Protocol For Nb Uva: The patient received NB UVA.
Protocol For Photochemotherapy: Baby Oil And Nbuvb: The patient received Photochemotherapy: Baby Oil and NBUVB (baby oil applied to all lesions prior to phototherapy).
Protocol For Photochemotherapy: Tar And Broad Band Uvb (Goeckerman Treatment): The patient received Photochemotherapy: Tar and Broad Band UVB (Goeckerman treatment).
Protocol For Nbuvb: The patient received NBUVB.
Consent: Written consent obtained.  The risks were reviewed with the patient including but not limited to: burn, pigmentary changes, pain, blistering, scabbing, redness, increased risk of skin cancers, and the remote possibility of scarring.
Protocol For Photochemotherapy For Severe Photoresponsive Dermatoses: Petrolatum And Broad Band Uvb: The patient received Photochemotherapyfor severe photoresponsive dermatoses: Petrolatum and Broad Band UVB requiring at least 4 to 8 hours of care under direct physician supervision.
Total Body Energy: 420
Total Body Time: 1:21
Protocol For Photochemotherapy For Severe Photoresponsive Dermatoses: Puva: The patient received Photochemotherapy for severe photoresponsive dermatoses: PUVA requiring at least 4 to 8 hours of care under direct physician supervision.
Protocol: NBUVB
Protocol For Puva: The patient received PUVA.
Protocol For Photochemotherapy: Petrolatum And Broad Band Uvb: The patient received Photochemotherapy: Petrolatum and Broad Band UVB.
Protocol For Photochemotherapy: Mineral Oil And Nbuvb: The patient received Photochemotherapy: Mineral Oil and NBUVB (mineral oil applied to all lesions prior to phototherapy).
Render Post-Care In The Note: no
Protocol For Photochemotherapy: Tar And Nbuvb (Goeckerman Treatment): The patient received Photochemotherapy: Tar and NBUVB (Goeckerman treatment).
Protocol For Uva1: The patient received UVA1.
Protocol For Bath Puva: The patient received Bath PUVA.
Protocol For Photochemotherapy: Mineral Oil And Broad Band Uvb: The patient received Photochemotherapy: Mineral Oil and Broad Band UVB.
Protocol For Uva: The patient received UVA.
Detail Level: Zone
Irradiance (Optional- Include Units): 5.2
Protocol For Photochemotherapy: Triamcinolone Ointment And Nbuvb: The patient received Photochemotherapy: Triamcinolone and NBUVB (triamcinolone ointment applied to all lesions prior to phototherapy).

## 2025-07-09 ENCOUNTER — APPOINTMENT (OUTPATIENT)
Dept: URBAN - METROPOLITAN AREA CLINIC 203 | Facility: CLINIC | Age: 75
Setting detail: DERMATOLOGY
End: 2025-07-09

## 2025-07-09 DIAGNOSIS — C84.0 MYCOSIS FUNGOIDES: ICD-10-CM

## 2025-07-09 PROBLEM — C84.00 MYCOSIS FUNGOIDES, UNSPECIFIED SITE: Status: ACTIVE | Noted: 2025-07-09

## 2025-07-09 PROCEDURE — ? COUNSELING

## 2025-07-09 PROCEDURE — ? PHOTOTHERAPY TREATMENT

## 2025-07-09 ASSESSMENT — LOCATION SIMPLE DESCRIPTION DERM
LOCATION SIMPLE: LEFT PRETIBIAL REGION
LOCATION SIMPLE: LEFT THIGH
LOCATION SIMPLE: RIGHT THIGH
LOCATION SIMPLE: LEFT BUTTOCK
LOCATION SIMPLE: RIGHT PRETIBIAL REGION

## 2025-07-09 ASSESSMENT — LOCATION ZONE DERM
LOCATION ZONE: LEG
LOCATION ZONE: TRUNK

## 2025-07-09 ASSESSMENT — LOCATION DETAILED DESCRIPTION DERM
LOCATION DETAILED: LEFT PROXIMAL PRETIBIAL REGION
LOCATION DETAILED: LEFT BUTTOCK
LOCATION DETAILED: RIGHT PROXIMAL PRETIBIAL REGION
LOCATION DETAILED: LEFT ANTERIOR PROXIMAL THIGH
LOCATION DETAILED: RIGHT ANTERIOR DISTAL THIGH

## 2025-07-09 NOTE — PROCEDURE: PHOTOTHERAPY TREATMENT
Name Of Supervising Technician: LING
Protocol For Protocol For Photochemotherapy For Severe Photoresponsive Dermatoses: Bath Puva: The patient received Photochemotherapy for severe photoresponsive dermatoses: Bath PUVA requiring at least 4 to 8 hours of care under direct physician supervision.
Protocol For Photochemotherapy For Severe Photoresponsive Dermatoses: Petrolatum And Nbuvb: The patient received Photochemotherapy for severe photoresponsive dermatoses: Petrolatum and NBUVB requiring at least 4 to 8 hours of care under direct physician supervision.
Protocol For Broad Band Uvb: The patient received Broad Band UVB.
Protocol For Photochemotherapy For Severe Photoresponsive Dermatoses: Tar And Nbuvb (Goeckerman Treatment): The patient received Photochemotherapy for severe photoresponsive dermatoses: Tar and NBUVB (Goeckerman treatment) requiring at least 4 to 8 hours of care under direct physician supervision.
Treatment Number: 20
Protocol For Photochemotherapy For Severe Photoresponsive Dermatoses: Tar And Broad Band Uvb (Goeckerman Treatment): The patient received Photochemotherapy for severe photoresponsive dermatoses: Tar and Broad Band UVB (Goeckerman treatment) requiring at least 4 to 8 hours of care under direct physician supervision.
Protocol For Photochemotherapy: Petrolatum And Nbuvb: The patient received Photochemotherapy: Petrolatum and NBUVB (petrolatum applied to all lesions prior to phototherapy).
Post-Care Instructions: I reviewed with the patient in detail post-care instructions. Patient is to wear sun protection. Patients may expect sunburn like redness, discomfort and scabbing.
Protocol For Nb Uva: The patient received NB UVA.
Protocol For Photochemotherapy: Baby Oil And Nbuvb: The patient received Photochemotherapy: Baby Oil and NBUVB (baby oil applied to all lesions prior to phototherapy).
Protocol For Photochemotherapy: Tar And Broad Band Uvb (Goeckerman Treatment): The patient received Photochemotherapy: Tar and Broad Band UVB (Goeckerman treatment).
Protocol For Nbuvb: The patient received NBUVB.
Consent: Written consent obtained.  The risks were reviewed with the patient including but not limited to: burn, pigmentary changes, pain, blistering, scabbing, redness, increased risk of skin cancers, and the remote possibility of scarring.
Protocol For Photochemotherapy For Severe Photoresponsive Dermatoses: Petrolatum And Broad Band Uvb: The patient received Photochemotherapyfor severe photoresponsive dermatoses: Petrolatum and Broad Band UVB requiring at least 4 to 8 hours of care under direct physician supervision.
Total Body Energy: 440
Total Body Time: 1:25
Protocol For Photochemotherapy For Severe Photoresponsive Dermatoses: Puva: The patient received Photochemotherapy for severe photoresponsive dermatoses: PUVA requiring at least 4 to 8 hours of care under direct physician supervision.
Protocol: NBUVB
Protocol For Puva: The patient received PUVA.
Protocol For Photochemotherapy: Petrolatum And Broad Band Uvb: The patient received Photochemotherapy: Petrolatum and Broad Band UVB.
Protocol For Photochemotherapy: Mineral Oil And Nbuvb: The patient received Photochemotherapy: Mineral Oil and NBUVB (mineral oil applied to all lesions prior to phototherapy).
Render Post-Care In The Note: no
Protocol For Photochemotherapy: Tar And Nbuvb (Goeckerman Treatment): The patient received Photochemotherapy: Tar and NBUVB (Goeckerman treatment).
Protocol For Uva1: The patient received UVA1.
Protocol For Bath Puva: The patient received Bath PUVA.
Protocol For Photochemotherapy: Mineral Oil And Broad Band Uvb: The patient received Photochemotherapy: Mineral Oil and Broad Band UVB.
Protocol For Uva: The patient received UVA.
Detail Level: Zone
Irradiance (Optional- Include Units): 5.2
Protocol For Photochemotherapy: Triamcinolone Ointment And Nbuvb: The patient received Photochemotherapy: Triamcinolone and NBUVB (triamcinolone ointment applied to all lesions prior to phototherapy).

## 2025-07-11 ENCOUNTER — APPOINTMENT (OUTPATIENT)
Dept: URBAN - METROPOLITAN AREA CLINIC 236 | Facility: CLINIC | Age: 75
Setting detail: DERMATOLOGY
End: 2025-07-11

## 2025-07-11 DIAGNOSIS — C84.0 MYCOSIS FUNGOIDES: ICD-10-CM

## 2025-07-11 PROBLEM — C84.00 MYCOSIS FUNGOIDES, UNSPECIFIED SITE: Status: ACTIVE | Noted: 2025-07-11

## 2025-07-11 PROCEDURE — ? PHOTOTHERAPY TREATMENT

## 2025-07-11 PROCEDURE — ? COUNSELING

## 2025-07-11 ASSESSMENT — LOCATION ZONE DERM
LOCATION ZONE: LEG
LOCATION ZONE: TRUNK

## 2025-07-11 ASSESSMENT — LOCATION DETAILED DESCRIPTION DERM
LOCATION DETAILED: LEFT BUTTOCK
LOCATION DETAILED: LEFT ANTERIOR PROXIMAL THIGH
LOCATION DETAILED: LEFT PROXIMAL PRETIBIAL REGION
LOCATION DETAILED: RIGHT PROXIMAL PRETIBIAL REGION
LOCATION DETAILED: RIGHT ANTERIOR DISTAL THIGH

## 2025-07-11 NOTE — PROCEDURE: PHOTOTHERAPY TREATMENT
Name Of Supervising Technician: LING
Protocol For Protocol For Photochemotherapy For Severe Photoresponsive Dermatoses: Bath Puva: The patient received Photochemotherapy for severe photoresponsive dermatoses: Bath PUVA requiring at least 4 to 8 hours of care under direct physician supervision.
Protocol For Photochemotherapy For Severe Photoresponsive Dermatoses: Petrolatum And Nbuvb: The patient received Photochemotherapy for severe photoresponsive dermatoses: Petrolatum and NBUVB requiring at least 4 to 8 hours of care under direct physician supervision.
Protocol For Broad Band Uvb: The patient received Broad Band UVB.
Protocol For Photochemotherapy For Severe Photoresponsive Dermatoses: Tar And Nbuvb (Goeckerman Treatment): The patient received Photochemotherapy for severe photoresponsive dermatoses: Tar and NBUVB (Goeckerman treatment) requiring at least 4 to 8 hours of care under direct physician supervision.
Treatment Number: 21
Protocol For Photochemotherapy For Severe Photoresponsive Dermatoses: Tar And Broad Band Uvb (Goeckerman Treatment): The patient received Photochemotherapy for severe photoresponsive dermatoses: Tar and Broad Band UVB (Goeckerman treatment) requiring at least 4 to 8 hours of care under direct physician supervision.
Protocol For Photochemotherapy: Petrolatum And Nbuvb: The patient received Photochemotherapy: Petrolatum and NBUVB (petrolatum applied to all lesions prior to phototherapy).
Post-Care Instructions: I reviewed with the patient in detail post-care instructions. Patient is to wear sun protection. Patients may expect sunburn like redness, discomfort and scabbing.
Protocol For Nb Uva: The patient received NB UVA.
Protocol For Photochemotherapy: Baby Oil And Nbuvb: The patient received Photochemotherapy: Baby Oil and NBUVB (baby oil applied to all lesions prior to phototherapy).
Protocol For Photochemotherapy: Tar And Broad Band Uvb (Goeckerman Treatment): The patient received Photochemotherapy: Tar and Broad Band UVB (Goeckerman treatment).
Protocol For Nbuvb: The patient received NBUVB.
Consent: Written consent obtained.  The risks were reviewed with the patient including but not limited to: burn, pigmentary changes, pain, blistering, scabbing, redness, increased risk of skin cancers, and the remote possibility of scarring.
Protocol For Photochemotherapy For Severe Photoresponsive Dermatoses: Petrolatum And Broad Band Uvb: The patient received Photochemotherapyfor severe photoresponsive dermatoses: Petrolatum and Broad Band UVB requiring at least 4 to 8 hours of care under direct physician supervision.
Total Body Energy: 460
Total Body Time: 1:28
Protocol For Photochemotherapy For Severe Photoresponsive Dermatoses: Puva: The patient received Photochemotherapy for severe photoresponsive dermatoses: PUVA requiring at least 4 to 8 hours of care under direct physician supervision.
Protocol: NBUVB
Protocol For Puva: The patient received PUVA.
Protocol For Photochemotherapy: Petrolatum And Broad Band Uvb: The patient received Photochemotherapy: Petrolatum and Broad Band UVB.
Protocol For Photochemotherapy: Mineral Oil And Nbuvb: The patient received Photochemotherapy: Mineral Oil and NBUVB (mineral oil applied to all lesions prior to phototherapy).
Render Post-Care In The Note: no
Protocol For Photochemotherapy: Tar And Nbuvb (Goeckerman Treatment): The patient received Photochemotherapy: Tar and NBUVB (Goeckerman treatment).
Protocol For Uva1: The patient received UVA1.
Protocol For Bath Puva: The patient received Bath PUVA.
Protocol For Photochemotherapy: Mineral Oil And Broad Band Uvb: The patient received Photochemotherapy: Mineral Oil and Broad Band UVB.
Protocol For Uva: The patient received UVA.
Detail Level: Zone
Irradiance (Optional- Include Units): 5.2
Protocol For Photochemotherapy: Triamcinolone Ointment And Nbuvb: The patient received Photochemotherapy: Triamcinolone and NBUVB (triamcinolone ointment applied to all lesions prior to phototherapy).

## 2025-07-14 ENCOUNTER — APPOINTMENT (OUTPATIENT)
Dept: URBAN - METROPOLITAN AREA CLINIC 236 | Facility: CLINIC | Age: 75
Setting detail: DERMATOLOGY
End: 2025-07-14

## 2025-07-14 DIAGNOSIS — C84.0 MYCOSIS FUNGOIDES: ICD-10-CM

## 2025-07-14 PROBLEM — C84.00 MYCOSIS FUNGOIDES, UNSPECIFIED SITE: Status: ACTIVE | Noted: 2025-07-14

## 2025-07-14 PROCEDURE — ? PHOTOTHERAPY TREATMENT

## 2025-07-14 PROCEDURE — ? COUNSELING

## 2025-07-14 ASSESSMENT — LOCATION SIMPLE DESCRIPTION DERM
LOCATION SIMPLE: RIGHT THIGH
LOCATION SIMPLE: LEFT PRETIBIAL REGION
LOCATION SIMPLE: RIGHT PRETIBIAL REGION
LOCATION SIMPLE: LEFT THIGH
LOCATION SIMPLE: LEFT BUTTOCK

## 2025-07-14 ASSESSMENT — LOCATION ZONE DERM
LOCATION ZONE: LEG
LOCATION ZONE: TRUNK

## 2025-07-14 ASSESSMENT — LOCATION DETAILED DESCRIPTION DERM
LOCATION DETAILED: LEFT PROXIMAL PRETIBIAL REGION
LOCATION DETAILED: RIGHT PROXIMAL PRETIBIAL REGION
LOCATION DETAILED: RIGHT ANTERIOR DISTAL THIGH
LOCATION DETAILED: LEFT ANTERIOR PROXIMAL THIGH
LOCATION DETAILED: LEFT BUTTOCK

## 2025-07-14 NOTE — PROCEDURE: PHOTOTHERAPY TREATMENT
Name Of Supervising Technician: THERESA
Protocol For Protocol For Photochemotherapy For Severe Photoresponsive Dermatoses: Bath Puva: The patient received Photochemotherapy for severe photoresponsive dermatoses: Bath PUVA requiring at least 4 to 8 hours of care under direct physician supervision.
Protocol For Photochemotherapy For Severe Photoresponsive Dermatoses: Petrolatum And Nbuvb: The patient received Photochemotherapy for severe photoresponsive dermatoses: Petrolatum and NBUVB requiring at least 4 to 8 hours of care under direct physician supervision.
Protocol For Broad Band Uvb: The patient received Broad Band UVB.
Protocol For Photochemotherapy For Severe Photoresponsive Dermatoses: Tar And Nbuvb (Goeckerman Treatment): The patient received Photochemotherapy for severe photoresponsive dermatoses: Tar and NBUVB (Goeckerman treatment) requiring at least 4 to 8 hours of care under direct physician supervision.
Treatment Number: 22
Protocol For Photochemotherapy For Severe Photoresponsive Dermatoses: Tar And Broad Band Uvb (Goeckerman Treatment): The patient received Photochemotherapy for severe photoresponsive dermatoses: Tar and Broad Band UVB (Goeckerman treatment) requiring at least 4 to 8 hours of care under direct physician supervision.
Protocol For Photochemotherapy: Petrolatum And Nbuvb: The patient received Photochemotherapy: Petrolatum and NBUVB (petrolatum applied to all lesions prior to phototherapy).
Post-Care Instructions: I reviewed with the patient in detail post-care instructions. Patient is to wear sun protection. Patients may expect sunburn like redness, discomfort and scabbing.
Protocol For Nb Uva: The patient received NB UVA.
Protocol For Photochemotherapy: Baby Oil And Nbuvb: The patient received Photochemotherapy: Baby Oil and NBUVB (baby oil applied to all lesions prior to phototherapy).
Protocol For Photochemotherapy: Tar And Broad Band Uvb (Goeckerman Treatment): The patient received Photochemotherapy: Tar and Broad Band UVB (Goeckerman treatment).
Protocol For Nbuvb: The patient received NBUVB.
Consent: Written consent obtained.  The risks were reviewed with the patient including but not limited to: burn, pigmentary changes, pain, blistering, scabbing, redness, increased risk of skin cancers, and the remote possibility of scarring.
Protocol For Photochemotherapy For Severe Photoresponsive Dermatoses: Petrolatum And Broad Band Uvb: The patient received Photochemotherapyfor severe photoresponsive dermatoses: Petrolatum and Broad Band UVB requiring at least 4 to 8 hours of care under direct physician supervision.
Total Body Energy: 460
Total Body Time: 1:28
Protocol For Photochemotherapy For Severe Photoresponsive Dermatoses: Puva: The patient received Photochemotherapy for severe photoresponsive dermatoses: PUVA requiring at least 4 to 8 hours of care under direct physician supervision.
Protocol: NBUVB
Protocol For Puva: The patient received PUVA.
Protocol For Photochemotherapy: Petrolatum And Broad Band Uvb: The patient received Photochemotherapy: Petrolatum and Broad Band UVB.
Protocol For Photochemotherapy: Mineral Oil And Nbuvb: The patient received Photochemotherapy: Mineral Oil and NBUVB (mineral oil applied to all lesions prior to phototherapy).
Render Post-Care In The Note: no
Protocol For Photochemotherapy: Tar And Nbuvb (Goeckerman Treatment): The patient received Photochemotherapy: Tar and NBUVB (Goeckerman treatment).
Protocol For Uva1: The patient received UVA1.
Protocol For Bath Puva: The patient received Bath PUVA.
Protocol For Photochemotherapy: Mineral Oil And Broad Band Uvb: The patient received Photochemotherapy: Mineral Oil and Broad Band UVB.
Protocol For Uva: The patient received UVA.
Detail Level: Zone
Irradiance (Optional- Include Units): 5.2
Protocol For Photochemotherapy: Triamcinolone Ointment And Nbuvb: The patient received Photochemotherapy: Triamcinolone and NBUVB (triamcinolone ointment applied to all lesions prior to phototherapy).

## 2025-07-16 ENCOUNTER — APPOINTMENT (OUTPATIENT)
Dept: URBAN - METROPOLITAN AREA CLINIC 236 | Facility: CLINIC | Age: 75
Setting detail: DERMATOLOGY
End: 2025-07-16

## 2025-07-16 DIAGNOSIS — C84.0 MYCOSIS FUNGOIDES: ICD-10-CM

## 2025-07-16 PROBLEM — C84.00 MYCOSIS FUNGOIDES, UNSPECIFIED SITE: Status: ACTIVE | Noted: 2025-07-16

## 2025-07-16 PROCEDURE — ? PHOTOTHERAPY TREATMENT

## 2025-07-16 PROCEDURE — ? COUNSELING

## 2025-07-16 ASSESSMENT — LOCATION DETAILED DESCRIPTION DERM
LOCATION DETAILED: LEFT PROXIMAL PRETIBIAL REGION
LOCATION DETAILED: LEFT BUTTOCK
LOCATION DETAILED: RIGHT ANTERIOR DISTAL THIGH
LOCATION DETAILED: LEFT ANTERIOR PROXIMAL THIGH
LOCATION DETAILED: RIGHT PROXIMAL PRETIBIAL REGION

## 2025-07-16 ASSESSMENT — LOCATION SIMPLE DESCRIPTION DERM
LOCATION SIMPLE: LEFT PRETIBIAL REGION
LOCATION SIMPLE: RIGHT PRETIBIAL REGION
LOCATION SIMPLE: LEFT THIGH
LOCATION SIMPLE: LEFT BUTTOCK
LOCATION SIMPLE: RIGHT THIGH

## 2025-07-16 ASSESSMENT — LOCATION ZONE DERM
LOCATION ZONE: LEG
LOCATION ZONE: TRUNK

## 2025-07-16 NOTE — PROCEDURE: PHOTOTHERAPY TREATMENT
Name Of Supervising Technician: DANE
Protocol For Protocol For Photochemotherapy For Severe Photoresponsive Dermatoses: Bath Puva: The patient received Photochemotherapy for severe photoresponsive dermatoses: Bath PUVA requiring at least 4 to 8 hours of care under direct physician supervision.
Protocol For Photochemotherapy For Severe Photoresponsive Dermatoses: Petrolatum And Nbuvb: The patient received Photochemotherapy for severe photoresponsive dermatoses: Petrolatum and NBUVB requiring at least 4 to 8 hours of care under direct physician supervision.
Protocol For Broad Band Uvb: The patient received Broad Band UVB.
Protocol For Photochemotherapy For Severe Photoresponsive Dermatoses: Tar And Nbuvb (Goeckerman Treatment): The patient received Photochemotherapy for severe photoresponsive dermatoses: Tar and NBUVB (Goeckerman treatment) requiring at least 4 to 8 hours of care under direct physician supervision.
Treatment Number: 23
Protocol For Photochemotherapy For Severe Photoresponsive Dermatoses: Tar And Broad Band Uvb (Goeckerman Treatment): The patient received Photochemotherapy for severe photoresponsive dermatoses: Tar and Broad Band UVB (Goeckerman treatment) requiring at least 4 to 8 hours of care under direct physician supervision.
Protocol For Photochemotherapy: Petrolatum And Nbuvb: The patient received Photochemotherapy: Petrolatum and NBUVB (petrolatum applied to all lesions prior to phototherapy).
Post-Care Instructions: I reviewed with the patient in detail post-care instructions. Patient is to wear sun protection. Patients may expect sunburn like redness, discomfort and scabbing.
Protocol For Nb Uva: The patient received NB UVA.
Protocol For Photochemotherapy: Baby Oil And Nbuvb: The patient received Photochemotherapy: Baby Oil and NBUVB (baby oil applied to all lesions prior to phototherapy).
Protocol For Photochemotherapy: Tar And Broad Band Uvb (Goeckerman Treatment): The patient received Photochemotherapy: Tar and Broad Band UVB (Goeckerman treatment).
Protocol For Nbuvb: The patient received NBUVB.
Consent: Written consent obtained.  The risks were reviewed with the patient including but not limited to: burn, pigmentary changes, pain, blistering, scabbing, redness, increased risk of skin cancers, and the remote possibility of scarring.
Protocol For Photochemotherapy For Severe Photoresponsive Dermatoses: Petrolatum And Broad Band Uvb: The patient received Photochemotherapyfor severe photoresponsive dermatoses: Petrolatum and Broad Band UVB requiring at least 4 to 8 hours of care under direct physician supervision.
Total Body Energy: 480
Total Body Time: 1:32
Protocol For Photochemotherapy For Severe Photoresponsive Dermatoses: Puva: The patient received Photochemotherapy for severe photoresponsive dermatoses: PUVA requiring at least 4 to 8 hours of care under direct physician supervision.
Protocol: NBUVB
Protocol For Puva: The patient received PUVA.
Protocol For Photochemotherapy: Petrolatum And Broad Band Uvb: The patient received Photochemotherapy: Petrolatum and Broad Band UVB.
Protocol For Photochemotherapy: Mineral Oil And Nbuvb: The patient received Photochemotherapy: Mineral Oil and NBUVB (mineral oil applied to all lesions prior to phototherapy).
Render Post-Care In The Note: no
Protocol For Photochemotherapy: Tar And Nbuvb (Goeckerman Treatment): The patient received Photochemotherapy: Tar and NBUVB (Goeckerman treatment).
Protocol For Uva1: The patient received UVA1.
Protocol For Bath Puva: The patient received Bath PUVA.
Protocol For Photochemotherapy: Mineral Oil And Broad Band Uvb: The patient received Photochemotherapy: Mineral Oil and Broad Band UVB.
Protocol For Uva: The patient received UVA.
Detail Level: Zone
Irradiance (Optional- Include Units): 5.2
Protocol For Photochemotherapy: Triamcinolone Ointment And Nbuvb: The patient received Photochemotherapy: Triamcinolone and NBUVB (triamcinolone ointment applied to all lesions prior to phototherapy).

## 2025-07-28 ENCOUNTER — APPOINTMENT (OUTPATIENT)
Dept: URBAN - METROPOLITAN AREA CLINIC 236 | Facility: CLINIC | Age: 75
Setting detail: DERMATOLOGY
End: 2025-07-28

## 2025-07-28 DIAGNOSIS — C84.0 MYCOSIS FUNGOIDES: ICD-10-CM

## 2025-07-28 PROBLEM — C84.00 MYCOSIS FUNGOIDES, UNSPECIFIED SITE: Status: ACTIVE | Noted: 2025-07-28

## 2025-07-28 PROCEDURE — ? PHOTOTHERAPY TREATMENT

## 2025-07-28 PROCEDURE — ? COUNSELING

## 2025-07-28 ASSESSMENT — LOCATION DETAILED DESCRIPTION DERM
LOCATION DETAILED: LEFT BUTTOCK
LOCATION DETAILED: RIGHT PROXIMAL PRETIBIAL REGION
LOCATION DETAILED: LEFT PROXIMAL PRETIBIAL REGION
LOCATION DETAILED: LEFT ANTERIOR PROXIMAL THIGH
LOCATION DETAILED: RIGHT ANTERIOR DISTAL THIGH

## 2025-07-28 ASSESSMENT — LOCATION SIMPLE DESCRIPTION DERM
LOCATION SIMPLE: LEFT THIGH
LOCATION SIMPLE: LEFT BUTTOCK
LOCATION SIMPLE: LEFT PRETIBIAL REGION
LOCATION SIMPLE: RIGHT THIGH
LOCATION SIMPLE: RIGHT PRETIBIAL REGION

## 2025-07-28 ASSESSMENT — LOCATION ZONE DERM
LOCATION ZONE: LEG
LOCATION ZONE: TRUNK

## 2025-07-28 NOTE — PROCEDURE: PHOTOTHERAPY TREATMENT
Post-Care Instructions: I reviewed with the patient in detail post-care instructions. Patient is to wear sun protection. Patients may expect sunburn like redness, discomfort and scabbing. V-Y Plasty Text: The defect edges were debeveled with a #15 scalpel blade.  Given the location of the defect, shape of the defect and the proximity to free margins an V-Y advancement flap was deemed most appropriate.  Using a sterile surgical marker, an appropriate advancement flap was drawn incorporating the defect and placing the expected incisions within the relaxed skin tension lines where possible.    The area thus outlined was incised deep to adipose tissue with a #15 scalpel blade.  The skin margins were undermined to an appropriate distance in all directions utilizing iris scissors.

## 2025-07-30 ENCOUNTER — APPOINTMENT (OUTPATIENT)
Dept: URBAN - METROPOLITAN AREA CLINIC 236 | Facility: CLINIC | Age: 75
Setting detail: DERMATOLOGY
End: 2025-07-30

## 2025-07-30 DIAGNOSIS — C84.0 MYCOSIS FUNGOIDES: ICD-10-CM

## 2025-07-30 PROBLEM — C84.00 MYCOSIS FUNGOIDES, UNSPECIFIED SITE: Status: ACTIVE | Noted: 2025-07-30

## 2025-07-30 PROCEDURE — ? PHOTOTHERAPY TREATMENT

## 2025-07-30 PROCEDURE — ? COUNSELING

## 2025-07-30 ASSESSMENT — LOCATION DETAILED DESCRIPTION DERM
LOCATION DETAILED: RIGHT PROXIMAL PRETIBIAL REGION
LOCATION DETAILED: LEFT BUTTOCK
LOCATION DETAILED: LEFT PROXIMAL PRETIBIAL REGION
LOCATION DETAILED: LEFT ANTERIOR PROXIMAL THIGH
LOCATION DETAILED: RIGHT ANTERIOR DISTAL THIGH

## 2025-07-30 ASSESSMENT — LOCATION ZONE DERM
LOCATION ZONE: LEG
LOCATION ZONE: TRUNK

## 2025-07-30 ASSESSMENT — LOCATION SIMPLE DESCRIPTION DERM
LOCATION SIMPLE: LEFT THIGH
LOCATION SIMPLE: LEFT PRETIBIAL REGION
LOCATION SIMPLE: RIGHT PRETIBIAL REGION
LOCATION SIMPLE: RIGHT THIGH
LOCATION SIMPLE: LEFT BUTTOCK

## 2025-07-30 NOTE — PROCEDURE: PHOTOTHERAPY TREATMENT
Name Of Supervising Technician: VIRIDIANA
Protocol For Protocol For Photochemotherapy For Severe Photoresponsive Dermatoses: Bath Puva: The patient received Photochemotherapy for severe photoresponsive dermatoses: Bath PUVA requiring at least 4 to 8 hours of care under direct physician supervision.
Protocol For Photochemotherapy For Severe Photoresponsive Dermatoses: Petrolatum And Nbuvb: The patient received Photochemotherapy for severe photoresponsive dermatoses: Petrolatum and NBUVB requiring at least 4 to 8 hours of care under direct physician supervision.
Protocol For Broad Band Uvb: The patient received Broad Band UVB.
Protocol For Photochemotherapy For Severe Photoresponsive Dermatoses: Tar And Nbuvb (Goeckerman Treatment): The patient received Photochemotherapy for severe photoresponsive dermatoses: Tar and NBUVB (Goeckerman treatment) requiring at least 4 to 8 hours of care under direct physician supervision.
Treatment Number: 25
Protocol For Photochemotherapy For Severe Photoresponsive Dermatoses: Tar And Broad Band Uvb (Goeckerman Treatment): The patient received Photochemotherapy for severe photoresponsive dermatoses: Tar and Broad Band UVB (Goeckerman treatment) requiring at least 4 to 8 hours of care under direct physician supervision.
Protocol For Photochemotherapy: Petrolatum And Nbuvb: The patient received Photochemotherapy: Petrolatum and NBUVB (petrolatum applied to all lesions prior to phototherapy).
Post-Care Instructions: I reviewed with the patient in detail post-care instructions. Patient is to wear sun protection. Patients may expect sunburn like redness, discomfort and scabbing.
Protocol For Nb Uva: The patient received NB UVA.
Protocol For Photochemotherapy: Baby Oil And Nbuvb: The patient received Photochemotherapy: Baby Oil and NBUVB (baby oil applied to all lesions prior to phototherapy).
Protocol For Photochemotherapy: Tar And Broad Band Uvb (Goeckerman Treatment): The patient received Photochemotherapy: Tar and Broad Band UVB (Goeckerman treatment).
Protocol For Nbuvb: The patient received NBUVB.
Consent: Written consent obtained.  The risks were reviewed with the patient including but not limited to: burn, pigmentary changes, pain, blistering, scabbing, redness, increased risk of skin cancers, and the remote possibility of scarring.
Protocol For Photochemotherapy For Severe Photoresponsive Dermatoses: Petrolatum And Broad Band Uvb: The patient received Photochemotherapyfor severe photoresponsive dermatoses: Petrolatum and Broad Band UVB requiring at least 4 to 8 hours of care under direct physician supervision.
Total Body Energy: 500
Total Body Time: 1:40
Protocol For Photochemotherapy For Severe Photoresponsive Dermatoses: Puva: The patient received Photochemotherapy for severe photoresponsive dermatoses: PUVA requiring at least 4 to 8 hours of care under direct physician supervision.
Protocol: NBUVB
Protocol For Puva: The patient received PUVA.
Protocol For Photochemotherapy: Petrolatum And Broad Band Uvb: The patient received Photochemotherapy: Petrolatum and Broad Band UVB.
Protocol For Photochemotherapy: Mineral Oil And Nbuvb: The patient received Photochemotherapy: Mineral Oil and NBUVB (mineral oil applied to all lesions prior to phototherapy).
Render Post-Care In The Note: no
Protocol For Photochemotherapy: Tar And Nbuvb (Goeckerman Treatment): The patient received Photochemotherapy: Tar and NBUVB (Goeckerman treatment).
Protocol For Uva1: The patient received UVA1.
Protocol For Bath Puva: The patient received Bath PUVA.
Protocol For Photochemotherapy: Mineral Oil And Broad Band Uvb: The patient received Photochemotherapy: Mineral Oil and Broad Band UVB.
Protocol For Uva: The patient received UVA.
Detail Level: Zone
Irradiance (Optional- Include Units): 5.2
Protocol For Photochemotherapy: Triamcinolone Ointment And Nbuvb: The patient received Photochemotherapy: Triamcinolone and NBUVB (triamcinolone ointment applied to all lesions prior to phototherapy).

## 2025-08-01 ENCOUNTER — APPOINTMENT (OUTPATIENT)
Dept: URBAN - METROPOLITAN AREA CLINIC 236 | Facility: CLINIC | Age: 75
Setting detail: DERMATOLOGY
End: 2025-08-01

## 2025-08-01 DIAGNOSIS — C84.0 MYCOSIS FUNGOIDES: ICD-10-CM | Status: INADEQUATELY CONTROLLED

## 2025-08-01 PROBLEM — C84.00 MYCOSIS FUNGOIDES, UNSPECIFIED SITE: Status: ACTIVE | Noted: 2025-08-01

## 2025-08-01 PROCEDURE — ? COUNSELING

## 2025-08-01 PROCEDURE — ? PHOTOTHERAPY TREATMENT

## 2025-08-01 ASSESSMENT — LOCATION DETAILED DESCRIPTION DERM
LOCATION DETAILED: LEFT BUTTOCK
LOCATION DETAILED: LEFT ANTERIOR PROXIMAL THIGH
LOCATION DETAILED: RIGHT PROXIMAL PRETIBIAL REGION
LOCATION DETAILED: RIGHT ANTERIOR DISTAL THIGH
LOCATION DETAILED: LEFT PROXIMAL PRETIBIAL REGION

## 2025-08-01 ASSESSMENT — LOCATION ZONE DERM
LOCATION ZONE: TRUNK
LOCATION ZONE: LEG

## 2025-08-01 ASSESSMENT — LOCATION SIMPLE DESCRIPTION DERM
LOCATION SIMPLE: RIGHT PRETIBIAL REGION
LOCATION SIMPLE: RIGHT THIGH
LOCATION SIMPLE: LEFT BUTTOCK
LOCATION SIMPLE: LEFT PRETIBIAL REGION
LOCATION SIMPLE: LEFT THIGH

## 2025-08-01 NOTE — PROCEDURE: PHOTOTHERAPY TREATMENT
Name Of Supervising Technician: VIRIDIANA
Protocol For Protocol For Photochemotherapy For Severe Photoresponsive Dermatoses: Bath Puva: The patient received Photochemotherapy for severe photoresponsive dermatoses: Bath PUVA requiring at least 4 to 8 hours of care under direct physician supervision.
Protocol For Photochemotherapy For Severe Photoresponsive Dermatoses: Petrolatum And Nbuvb: The patient received Photochemotherapy for severe photoresponsive dermatoses: Petrolatum and NBUVB requiring at least 4 to 8 hours of care under direct physician supervision.
Protocol For Broad Band Uvb: The patient received Broad Band UVB.
Protocol For Photochemotherapy For Severe Photoresponsive Dermatoses: Tar And Nbuvb (Goeckerman Treatment): The patient received Photochemotherapy for severe photoresponsive dermatoses: Tar and NBUVB (Goeckerman treatment) requiring at least 4 to 8 hours of care under direct physician supervision.
Treatment Number: 25
Protocol For Photochemotherapy For Severe Photoresponsive Dermatoses: Tar And Broad Band Uvb (Goeckerman Treatment): The patient received Photochemotherapy for severe photoresponsive dermatoses: Tar and Broad Band UVB (Goeckerman treatment) requiring at least 4 to 8 hours of care under direct physician supervision.
Protocol For Photochemotherapy: Petrolatum And Nbuvb: The patient received Photochemotherapy: Petrolatum and NBUVB (petrolatum applied to all lesions prior to phototherapy).
Post-Care Instructions: I reviewed with the patient in detail post-care instructions. Patient is to wear sun protection. Patients may expect sunburn like redness, discomfort and scabbing.
Protocol For Nb Uva: The patient received NB UVA.
Protocol For Photochemotherapy: Baby Oil And Nbuvb: The patient received Photochemotherapy: Baby Oil and NBUVB (baby oil applied to all lesions prior to phototherapy).
Protocol For Photochemotherapy: Tar And Broad Band Uvb (Goeckerman Treatment): The patient received Photochemotherapy: Tar and Broad Band UVB (Goeckerman treatment).
Protocol For Nbuvb: The patient received NBUVB.
Consent: Written consent obtained.  The risks were reviewed with the patient including but not limited to: burn, pigmentary changes, pain, blistering, scabbing, redness, increased risk of skin cancers, and the remote possibility of scarring.
Protocol For Photochemotherapy For Severe Photoresponsive Dermatoses: Petrolatum And Broad Band Uvb: The patient received Photochemotherapyfor severe photoresponsive dermatoses: Petrolatum and Broad Band UVB requiring at least 4 to 8 hours of care under direct physician supervision.
Total Body Energy: 520
Total Body Time: 1:40
Protocol For Photochemotherapy For Severe Photoresponsive Dermatoses: Puva: The patient received Photochemotherapy for severe photoresponsive dermatoses: PUVA requiring at least 4 to 8 hours of care under direct physician supervision.
Protocol: NBUVB
Protocol For Puva: The patient received PUVA.
Protocol For Photochemotherapy: Petrolatum And Broad Band Uvb: The patient received Photochemotherapy: Petrolatum and Broad Band UVB.
Protocol For Photochemotherapy: Mineral Oil And Nbuvb: The patient received Photochemotherapy: Mineral Oil and NBUVB (mineral oil applied to all lesions prior to phototherapy).
Render Post-Care In The Note: no
Protocol For Photochemotherapy: Tar And Nbuvb (Goeckerman Treatment): The patient received Photochemotherapy: Tar and NBUVB (Goeckerman treatment).
Protocol For Uva1: The patient received UVA1.
Protocol For Bath Puva: The patient received Bath PUVA.
Protocol For Photochemotherapy: Mineral Oil And Broad Band Uvb: The patient received Photochemotherapy: Mineral Oil and Broad Band UVB.
Protocol For Uva: The patient received UVA.
Detail Level: Zone
Irradiance (Optional- Include Units): 5.2
Protocol For Photochemotherapy: Triamcinolone Ointment And Nbuvb: The patient received Photochemotherapy: Triamcinolone and NBUVB (triamcinolone ointment applied to all lesions prior to phototherapy).

## 2025-08-04 ENCOUNTER — APPOINTMENT (OUTPATIENT)
Dept: URBAN - METROPOLITAN AREA CLINIC 236 | Facility: CLINIC | Age: 75
Setting detail: DERMATOLOGY
End: 2025-08-04

## 2025-08-04 DIAGNOSIS — C84.0 MYCOSIS FUNGOIDES: ICD-10-CM

## 2025-08-04 PROBLEM — C84.00 MYCOSIS FUNGOIDES, UNSPECIFIED SITE: Status: ACTIVE | Noted: 2025-08-04

## 2025-08-04 PROCEDURE — ? COUNSELING

## 2025-08-04 PROCEDURE — ? PHOTOTHERAPY TREATMENT

## 2025-08-04 ASSESSMENT — LOCATION DETAILED DESCRIPTION DERM
LOCATION DETAILED: LEFT BUTTOCK
LOCATION DETAILED: RIGHT ANTERIOR DISTAL THIGH
LOCATION DETAILED: RIGHT PROXIMAL PRETIBIAL REGION
LOCATION DETAILED: LEFT PROXIMAL PRETIBIAL REGION
LOCATION DETAILED: LEFT ANTERIOR PROXIMAL THIGH

## 2025-08-04 ASSESSMENT — LOCATION SIMPLE DESCRIPTION DERM
LOCATION SIMPLE: LEFT BUTTOCK
LOCATION SIMPLE: RIGHT THIGH
LOCATION SIMPLE: LEFT THIGH
LOCATION SIMPLE: LEFT PRETIBIAL REGION
LOCATION SIMPLE: RIGHT PRETIBIAL REGION

## 2025-08-04 ASSESSMENT — LOCATION ZONE DERM
LOCATION ZONE: TRUNK
LOCATION ZONE: LEG

## 2025-08-06 ENCOUNTER — APPOINTMENT (OUTPATIENT)
Dept: URBAN - METROPOLITAN AREA CLINIC 203 | Facility: CLINIC | Age: 75
Setting detail: DERMATOLOGY
End: 2025-08-06

## 2025-08-06 DIAGNOSIS — C84.0 MYCOSIS FUNGOIDES: ICD-10-CM

## 2025-08-06 PROBLEM — C84.00 MYCOSIS FUNGOIDES, UNSPECIFIED SITE: Status: ACTIVE | Noted: 2025-08-06

## 2025-08-06 PROCEDURE — ? COUNSELING

## 2025-08-06 PROCEDURE — ? PHOTOTHERAPY TREATMENT

## 2025-08-06 ASSESSMENT — LOCATION SIMPLE DESCRIPTION DERM
LOCATION SIMPLE: RIGHT THIGH
LOCATION SIMPLE: LEFT THIGH
LOCATION SIMPLE: LEFT BUTTOCK
LOCATION SIMPLE: LEFT PRETIBIAL REGION
LOCATION SIMPLE: RIGHT PRETIBIAL REGION

## 2025-08-06 ASSESSMENT — LOCATION ZONE DERM
LOCATION ZONE: TRUNK
LOCATION ZONE: LEG

## 2025-08-11 ENCOUNTER — APPOINTMENT (OUTPATIENT)
Dept: URBAN - METROPOLITAN AREA CLINIC 236 | Facility: CLINIC | Age: 75
Setting detail: DERMATOLOGY
End: 2025-08-11

## 2025-08-11 DIAGNOSIS — C84.0 MYCOSIS FUNGOIDES: ICD-10-CM

## 2025-08-11 PROBLEM — C84.00 MYCOSIS FUNGOIDES, UNSPECIFIED SITE: Status: ACTIVE | Noted: 2025-08-11

## 2025-08-11 PROCEDURE — ? PHOTOTHERAPY TREATMENT

## 2025-08-11 PROCEDURE — ? COUNSELING

## 2025-08-11 ASSESSMENT — LOCATION SIMPLE DESCRIPTION DERM
LOCATION SIMPLE: LEFT PRETIBIAL REGION
LOCATION SIMPLE: RIGHT THIGH
LOCATION SIMPLE: LEFT BUTTOCK
LOCATION SIMPLE: RIGHT PRETIBIAL REGION
LOCATION SIMPLE: LEFT THIGH

## 2025-08-11 ASSESSMENT — LOCATION DETAILED DESCRIPTION DERM
LOCATION DETAILED: RIGHT PROXIMAL PRETIBIAL REGION
LOCATION DETAILED: LEFT ANTERIOR PROXIMAL THIGH
LOCATION DETAILED: LEFT PROXIMAL PRETIBIAL REGION
LOCATION DETAILED: LEFT BUTTOCK
LOCATION DETAILED: RIGHT ANTERIOR DISTAL THIGH

## 2025-08-11 ASSESSMENT — LOCATION ZONE DERM
LOCATION ZONE: TRUNK
LOCATION ZONE: LEG

## 2025-08-13 ENCOUNTER — APPOINTMENT (OUTPATIENT)
Dept: URBAN - METROPOLITAN AREA CLINIC 236 | Facility: CLINIC | Age: 75
Setting detail: DERMATOLOGY
End: 2025-08-13

## 2025-08-13 ENCOUNTER — RX ONLY (RX ONLY)
Age: 75
End: 2025-08-13

## 2025-08-13 DIAGNOSIS — C84.0 MYCOSIS FUNGOIDES: ICD-10-CM

## 2025-08-13 PROBLEM — C84.00 MYCOSIS FUNGOIDES, UNSPECIFIED SITE: Status: ACTIVE | Noted: 2025-08-13

## 2025-08-13 PROCEDURE — ? COUNSELING

## 2025-08-13 PROCEDURE — ? PHOTOTHERAPY TREATMENT

## 2025-08-13 RX ORDER — METHOTREXATE SODIUM 2.5 MG/1
TABLET ORAL QWEEK
Qty: 72 | Refills: 1 | Status: ERX

## 2025-08-13 RX ORDER — FOLIC ACID 1 MG/1
TABLET ORAL
Qty: 90 | Refills: 1 | Status: ERX

## 2025-08-13 ASSESSMENT — LOCATION DETAILED DESCRIPTION DERM
LOCATION DETAILED: LEFT PROXIMAL PRETIBIAL REGION
LOCATION DETAILED: LEFT BUTTOCK
LOCATION DETAILED: LEFT ANTERIOR PROXIMAL THIGH
LOCATION DETAILED: RIGHT PROXIMAL PRETIBIAL REGION
LOCATION DETAILED: RIGHT ANTERIOR DISTAL THIGH

## 2025-08-13 ASSESSMENT — LOCATION SIMPLE DESCRIPTION DERM
LOCATION SIMPLE: LEFT BUTTOCK
LOCATION SIMPLE: LEFT PRETIBIAL REGION
LOCATION SIMPLE: RIGHT PRETIBIAL REGION
LOCATION SIMPLE: RIGHT THIGH
LOCATION SIMPLE: LEFT THIGH

## 2025-08-13 ASSESSMENT — LOCATION ZONE DERM
LOCATION ZONE: TRUNK
LOCATION ZONE: LEG

## 2025-08-15 ENCOUNTER — APPOINTMENT (OUTPATIENT)
Dept: URBAN - METROPOLITAN AREA CLINIC 236 | Facility: CLINIC | Age: 75
Setting detail: DERMATOLOGY
End: 2025-08-15

## 2025-08-15 DIAGNOSIS — C84.0 MYCOSIS FUNGOIDES: ICD-10-CM

## 2025-08-15 PROBLEM — C84.00 MYCOSIS FUNGOIDES, UNSPECIFIED SITE: Status: ACTIVE | Noted: 2025-08-15

## 2025-08-15 PROCEDURE — ? COUNSELING

## 2025-08-15 PROCEDURE — ? PHOTOTHERAPY TREATMENT

## 2025-08-15 ASSESSMENT — LOCATION ZONE DERM
LOCATION ZONE: TRUNK
LOCATION ZONE: LEG

## 2025-08-15 ASSESSMENT — LOCATION DETAILED DESCRIPTION DERM
LOCATION DETAILED: LEFT PROXIMAL PRETIBIAL REGION
LOCATION DETAILED: RIGHT ANTERIOR DISTAL THIGH
LOCATION DETAILED: RIGHT PROXIMAL PRETIBIAL REGION
LOCATION DETAILED: LEFT BUTTOCK
LOCATION DETAILED: LEFT ANTERIOR PROXIMAL THIGH

## 2025-08-15 ASSESSMENT — LOCATION SIMPLE DESCRIPTION DERM
LOCATION SIMPLE: RIGHT THIGH
LOCATION SIMPLE: LEFT THIGH
LOCATION SIMPLE: RIGHT PRETIBIAL REGION
LOCATION SIMPLE: LEFT PRETIBIAL REGION
LOCATION SIMPLE: LEFT BUTTOCK

## 2025-08-18 ENCOUNTER — APPOINTMENT (OUTPATIENT)
Dept: URBAN - METROPOLITAN AREA CLINIC 236 | Facility: CLINIC | Age: 75
Setting detail: DERMATOLOGY
End: 2025-08-18

## 2025-08-18 DIAGNOSIS — C84.0 MYCOSIS FUNGOIDES: ICD-10-CM

## 2025-08-18 PROBLEM — C84.00 MYCOSIS FUNGOIDES, UNSPECIFIED SITE: Status: ACTIVE | Noted: 2025-08-18

## 2025-08-18 PROCEDURE — ? PHOTOTHERAPY TREATMENT

## 2025-08-18 PROCEDURE — ? COUNSELING

## 2025-08-18 ASSESSMENT — LOCATION SIMPLE DESCRIPTION DERM
LOCATION SIMPLE: LEFT PRETIBIAL REGION
LOCATION SIMPLE: RIGHT THIGH
LOCATION SIMPLE: LEFT BUTTOCK
LOCATION SIMPLE: LEFT THIGH
LOCATION SIMPLE: RIGHT PRETIBIAL REGION

## 2025-08-18 ASSESSMENT — LOCATION DETAILED DESCRIPTION DERM
LOCATION DETAILED: RIGHT ANTERIOR DISTAL THIGH
LOCATION DETAILED: LEFT PROXIMAL PRETIBIAL REGION
LOCATION DETAILED: LEFT BUTTOCK
LOCATION DETAILED: LEFT ANTERIOR PROXIMAL THIGH
LOCATION DETAILED: RIGHT PROXIMAL PRETIBIAL REGION

## 2025-08-18 ASSESSMENT — LOCATION ZONE DERM
LOCATION ZONE: TRUNK
LOCATION ZONE: LEG

## 2025-08-20 ENCOUNTER — APPOINTMENT (OUTPATIENT)
Dept: URBAN - METROPOLITAN AREA CLINIC 236 | Facility: CLINIC | Age: 75
Setting detail: DERMATOLOGY
End: 2025-08-20

## 2025-08-20 DIAGNOSIS — C84.0 MYCOSIS FUNGOIDES: ICD-10-CM

## 2025-08-20 PROBLEM — C84.00 MYCOSIS FUNGOIDES, UNSPECIFIED SITE: Status: ACTIVE | Noted: 2025-08-20

## 2025-08-20 PROCEDURE — ? PHOTOTHERAPY TREATMENT

## 2025-08-20 PROCEDURE — ? COUNSELING

## 2025-08-20 ASSESSMENT — LOCATION SIMPLE DESCRIPTION DERM
LOCATION SIMPLE: RIGHT THIGH
LOCATION SIMPLE: LEFT BUTTOCK
LOCATION SIMPLE: RIGHT PRETIBIAL REGION
LOCATION SIMPLE: LEFT PRETIBIAL REGION
LOCATION SIMPLE: LEFT THIGH

## 2025-08-20 ASSESSMENT — LOCATION DETAILED DESCRIPTION DERM
LOCATION DETAILED: LEFT BUTTOCK
LOCATION DETAILED: LEFT PROXIMAL PRETIBIAL REGION
LOCATION DETAILED: LEFT ANTERIOR PROXIMAL THIGH
LOCATION DETAILED: RIGHT ANTERIOR DISTAL THIGH
LOCATION DETAILED: RIGHT PROXIMAL PRETIBIAL REGION

## 2025-08-20 ASSESSMENT — LOCATION ZONE DERM
LOCATION ZONE: TRUNK
LOCATION ZONE: LEG

## 2025-08-22 ENCOUNTER — APPOINTMENT (OUTPATIENT)
Dept: URBAN - METROPOLITAN AREA CLINIC 236 | Facility: CLINIC | Age: 75
Setting detail: DERMATOLOGY
End: 2025-08-22

## 2025-08-22 DIAGNOSIS — C84.0 MYCOSIS FUNGOIDES: ICD-10-CM

## 2025-08-22 DIAGNOSIS — Z02.9 ENCOUNTER FOR ADMINISTRATIVE EXAMINATIONS, UNSPECIFIED: ICD-10-CM

## 2025-08-22 PROBLEM — C84.00 MYCOSIS FUNGOIDES, UNSPECIFIED SITE: Status: ACTIVE | Noted: 2025-08-22

## 2025-08-22 PROCEDURE — ? ADDITIONAL NOTES

## 2025-08-22 PROCEDURE — ? PHOTOTHERAPY TREATMENT

## 2025-08-22 PROCEDURE — ? COUNSELING

## 2025-08-22 ASSESSMENT — LOCATION ZONE DERM
LOCATION ZONE: TRUNK
LOCATION ZONE: LEG

## 2025-08-22 ASSESSMENT — LOCATION DETAILED DESCRIPTION DERM
LOCATION DETAILED: LEFT BUTTOCK
LOCATION DETAILED: RIGHT PROXIMAL PRETIBIAL REGION
LOCATION DETAILED: LEFT ANTERIOR PROXIMAL THIGH
LOCATION DETAILED: RIGHT ANTERIOR DISTAL THIGH
LOCATION DETAILED: LEFT PROXIMAL PRETIBIAL REGION

## 2025-08-25 ENCOUNTER — APPOINTMENT (OUTPATIENT)
Dept: URBAN - METROPOLITAN AREA CLINIC 236 | Facility: CLINIC | Age: 75
Setting detail: DERMATOLOGY
End: 2025-08-25

## 2025-08-25 DIAGNOSIS — C84.0 MYCOSIS FUNGOIDES: ICD-10-CM

## 2025-08-25 PROBLEM — C84.00 MYCOSIS FUNGOIDES, UNSPECIFIED SITE: Status: ACTIVE | Noted: 2025-08-25

## 2025-08-25 PROCEDURE — ? PHOTOTHERAPY TREATMENT

## 2025-08-25 PROCEDURE — ? COUNSELING

## 2025-08-25 ASSESSMENT — LOCATION ZONE DERM
LOCATION ZONE: TRUNK
LOCATION ZONE: LEG

## 2025-08-25 ASSESSMENT — LOCATION SIMPLE DESCRIPTION DERM
LOCATION SIMPLE: RIGHT PRETIBIAL REGION
LOCATION SIMPLE: LEFT PRETIBIAL REGION
LOCATION SIMPLE: RIGHT THIGH
LOCATION SIMPLE: LEFT THIGH
LOCATION SIMPLE: LEFT BUTTOCK